# Patient Record
Sex: FEMALE | Race: WHITE | Employment: OTHER | URBAN - METROPOLITAN AREA
[De-identification: names, ages, dates, MRNs, and addresses within clinical notes are randomized per-mention and may not be internally consistent; named-entity substitution may affect disease eponyms.]

---

## 2017-03-21 PROBLEM — E03.9 HYPOTHYROIDISM: Status: ACTIVE | Noted: 2017-03-21

## 2017-03-21 PROBLEM — E55.9 VITAMIN D DEFICIENCY: Status: ACTIVE | Noted: 2017-03-21

## 2017-08-29 ENCOUNTER — CARE COORDINATION (OUTPATIENT)
Dept: CARE COORDINATION | Age: 82
End: 2017-08-29

## 2017-09-19 PROBLEM — N18.30 CKD (CHRONIC KIDNEY DISEASE), STAGE III (HCC): Status: ACTIVE | Noted: 2017-09-19

## 2018-03-20 ENCOUNTER — OFFICE VISIT (OUTPATIENT)
Dept: FAMILY MEDICINE CLINIC | Age: 83
End: 2018-03-20
Payer: MEDICARE

## 2018-03-20 VITALS
SYSTOLIC BLOOD PRESSURE: 113 MMHG | HEIGHT: 67 IN | DIASTOLIC BLOOD PRESSURE: 75 MMHG | RESPIRATION RATE: 20 BRPM | WEIGHT: 150 LBS | BODY MASS INDEX: 23.54 KG/M2 | HEART RATE: 77 BPM | TEMPERATURE: 97.3 F

## 2018-03-20 DIAGNOSIS — N18.30 CKD (CHRONIC KIDNEY DISEASE), STAGE III (HCC): ICD-10-CM

## 2018-03-20 DIAGNOSIS — I48.91 ATRIAL FIBRILLATION WITH RAPID VENTRICULAR RESPONSE (HCC): Primary | ICD-10-CM

## 2018-03-20 DIAGNOSIS — G47.9 SLEEP DISTURBANCE: ICD-10-CM

## 2018-03-20 DIAGNOSIS — R05.9 COUGH: ICD-10-CM

## 2018-03-20 DIAGNOSIS — R49.0 HOARSENESS OF VOICE: ICD-10-CM

## 2018-03-20 LAB
INTERNATIONAL NORMALIZATION RATIO, POC: 2.4
PROTHROMBIN TIME, POC: 29.1

## 2018-03-20 PROCEDURE — G8598 ASA/ANTIPLAT THER USED: HCPCS | Performed by: FAMILY MEDICINE

## 2018-03-20 PROCEDURE — 1036F TOBACCO NON-USER: CPT | Performed by: FAMILY MEDICINE

## 2018-03-20 PROCEDURE — 85610 PROTHROMBIN TIME: CPT | Performed by: FAMILY MEDICINE

## 2018-03-20 PROCEDURE — 1090F PRES/ABSN URINE INCON ASSESS: CPT | Performed by: FAMILY MEDICINE

## 2018-03-20 PROCEDURE — G8427 DOCREV CUR MEDS BY ELIG CLIN: HCPCS | Performed by: FAMILY MEDICINE

## 2018-03-20 PROCEDURE — G8484 FLU IMMUNIZE NO ADMIN: HCPCS | Performed by: FAMILY MEDICINE

## 2018-03-20 PROCEDURE — 1123F ACP DISCUSS/DSCN MKR DOCD: CPT | Performed by: FAMILY MEDICINE

## 2018-03-20 PROCEDURE — 99214 OFFICE O/P EST MOD 30 MIN: CPT | Performed by: FAMILY MEDICINE

## 2018-03-20 PROCEDURE — G8420 CALC BMI NORM PARAMETERS: HCPCS | Performed by: FAMILY MEDICINE

## 2018-03-20 PROCEDURE — 4040F PNEUMOC VAC/ADMIN/RCVD: CPT | Performed by: FAMILY MEDICINE

## 2018-03-20 RX ORDER — OMEPRAZOLE 40 MG/1
40 CAPSULE, DELAYED RELEASE ORAL
Qty: 30 CAPSULE | Refills: 0 | Status: SHIPPED | OUTPATIENT
Start: 2018-03-20 | End: 2018-10-24

## 2018-03-20 NOTE — PROGRESS NOTES
FM Progress Note    Subjective: Travis Gonzalez is a 80y.o. year old female here for HTN and CKD. Health Maintenance Due   Topic Date Due    DTaP/Tdap/Td vaccine (1 - Tdap) 06/15/1947    Shingles Vaccine (1 of 2 - 2 Dose Series) 06/15/1978    Pneumococcal low/med risk (1 of 2 - PCV13) 06/15/1993    Flu vaccine (1) 09/01/2017       Taking metoprolol for HTN. BP is controlled. No CP or SOB. Follows w/ Nephro, needs new appt. Stopped lisinopril in past to see if her voice gets better. It has not. Did not sleep at all the last night. Nothing out of the ordinary yesterday. Watches TV before going to bed but no different than usual. Is currently tired. When pt does fall asleep she gets nocturnal cough. No regurgitation or heartburn. No nasal congestion. Legs do swell, and propping them up on pillows once did not help. Cough for the last month and a half. Walks a lot w/o SOB. For Afib on coumadin and metoprolol and multaq. no palpitations. Past Medical History:   Diagnosis Date    [facility]     Allergic rhinitis     Anticoagulant long-term use     Atrial flutter by electrocardiogram ([facility] [address] 02/10/2014    Admitted [facility] 02/10/14 and cardioverted on 02/13/2014. Resumed on Coumadin.     CAD (coronary artery disease)     CKD (chronic kidney disease), stage III 9/19/2017    HTN (hypertension)     Hyperlipidemia     Hypothyroidism 3/21/2017    MI (myocardial infarction) 6/12    Shingles     Urinary incontinence      Past Surgical History:   Procedure Laterality Date    A-V CARDIAC PACEMAKER INSERTION  11/4/2014    Dr Vinay Gerber DYSRHYTHMIC FOCUS  6/6/12    modified maze procedure with LA excision     CARDIAC CATHETERIZATION  06/04/2012    Dr. Sharmin Liang  10/21/2014    Dr Isabel Kruse:  200joules = SR    COLONOSCOPY      CORONARY ARTERY BYPASS GRAFT  6/6/12    4 vessel / Dr. Radha Gipson  6/5/2012         [address] [facility] omeprazole (PRILOSEC) 40 MG delayed release capsule; Take 1 capsule by mouth daily (with breakfast)    Hoarseness of voice  -     omeprazole (PRILOSEC) 40 MG delayed release capsule; Take 1 capsule by mouth daily (with breakfast)    Sleep disturbance            DDx: Cough due to GERD versus postnasal drip versus less likely CHF. Patient Instructions   Continue the metoprolol and the multaq and the coumadin at the same dose for the Afib. Come back in 1 month for INR check. Continue compression stockings and keep the legs elevated for leg swelling. Make a follow up appointment with your kidney doctor. Make another appointment with your heart doctor. Return in about 1 month (around 4/20/2018) for cough.           Electronically signed by Micheal Nuno MD on 3/20/2018 at 10:37 AM

## 2018-03-20 NOTE — PATIENT INSTRUCTIONS
Continue the metoprolol and the multaq and the coumadin at the same dose for the Afib. Come back in 1 month for INR check. Continue compression stockings and keep the legs elevated for leg swelling. Make a follow up appointment with your kidney doctor. Make another appointment with your heart doctor.

## 2018-03-21 ENCOUNTER — TELEPHONE (OUTPATIENT)
Dept: FAMILY MEDICINE CLINIC | Age: 83
End: 2018-03-21

## 2018-03-21 DIAGNOSIS — I48.91 ATRIAL FIBRILLATION WITH RAPID VENTRICULAR RESPONSE (HCC): ICD-10-CM

## 2018-03-21 RX ORDER — WARFARIN SODIUM 3 MG/1
TABLET ORAL
Qty: 30 TABLET | Refills: 3 | Status: CANCELLED | OUTPATIENT
Start: 2018-03-21

## 2018-03-21 RX ORDER — WARFARIN SODIUM 3 MG/1
TABLET ORAL
Qty: 30 TABLET | Refills: 11 | Status: SHIPPED | OUTPATIENT
Start: 2018-03-21 | End: 2018-04-19 | Stop reason: DRUGHIGH

## 2018-03-22 ENCOUNTER — HOSPITAL ENCOUNTER (OUTPATIENT)
Age: 83
Discharge: HOME | End: 2018-03-22
Payer: MEDICARE

## 2018-03-22 LAB
HAPTOGLOBIN: 141 MG/DL (ref 30–200)
LACTATE DEHYDROGENASE: 235 U/L (ref 135–214)
TOTAL CK: 58 U/L (ref 20–180)

## 2018-03-22 PROCEDURE — 82550 ASSAY OF CK (CPK): CPT

## 2018-03-22 PROCEDURE — 36415 COLL VENOUS BLD VENIPUNCTURE: CPT

## 2018-03-22 PROCEDURE — 83615 LACTATE (LD) (LDH) ENZYME: CPT

## 2018-03-22 PROCEDURE — 83010 ASSAY OF HAPTOGLOBIN QUANT: CPT

## 2018-04-11 ENCOUNTER — OFFICE VISIT (OUTPATIENT)
Dept: FAMILY MEDICINE CLINIC | Age: 83
End: 2018-04-11
Payer: MEDICARE

## 2018-04-11 VITALS
HEART RATE: 75 BPM | RESPIRATION RATE: 16 BRPM | OXYGEN SATURATION: 95 % | WEIGHT: 150 LBS | HEIGHT: 67 IN | SYSTOLIC BLOOD PRESSURE: 117 MMHG | DIASTOLIC BLOOD PRESSURE: 74 MMHG | BODY MASS INDEX: 23.54 KG/M2

## 2018-04-11 DIAGNOSIS — Z91.09 ENVIRONMENTAL ALLERGIES: Primary | ICD-10-CM

## 2018-04-11 DIAGNOSIS — R09.82 POST-NASAL DRIP: ICD-10-CM

## 2018-04-11 PROCEDURE — G8420 CALC BMI NORM PARAMETERS: HCPCS | Performed by: FAMILY MEDICINE

## 2018-04-11 PROCEDURE — 99213 OFFICE O/P EST LOW 20 MIN: CPT | Performed by: FAMILY MEDICINE

## 2018-04-11 PROCEDURE — 1123F ACP DISCUSS/DSCN MKR DOCD: CPT | Performed by: FAMILY MEDICINE

## 2018-04-11 PROCEDURE — 1036F TOBACCO NON-USER: CPT | Performed by: FAMILY MEDICINE

## 2018-04-11 PROCEDURE — G8598 ASA/ANTIPLAT THER USED: HCPCS | Performed by: FAMILY MEDICINE

## 2018-04-11 PROCEDURE — G8427 DOCREV CUR MEDS BY ELIG CLIN: HCPCS | Performed by: FAMILY MEDICINE

## 2018-04-11 PROCEDURE — 4040F PNEUMOC VAC/ADMIN/RCVD: CPT | Performed by: FAMILY MEDICINE

## 2018-04-11 PROCEDURE — 1090F PRES/ABSN URINE INCON ASSESS: CPT | Performed by: FAMILY MEDICINE

## 2018-04-11 RX ORDER — FEXOFENADINE HYDROCHLORIDE 60 MG/1
60 TABLET, FILM COATED ORAL 2 TIMES DAILY
Qty: 7 TABLET | Refills: 0 | Status: SHIPPED | OUTPATIENT
Start: 2018-04-11 | End: 2018-10-24 | Stop reason: ALTCHOICE

## 2018-04-11 RX ORDER — FLUTICASONE PROPIONATE 50 MCG
2 SPRAY, SUSPENSION (ML) NASAL DAILY
Qty: 1 BOTTLE | Refills: 0 | Status: SHIPPED | OUTPATIENT
Start: 2018-04-11 | End: 2018-10-24 | Stop reason: ALTCHOICE

## 2018-04-11 ASSESSMENT — ENCOUNTER SYMPTOMS
NAUSEA: 0
ABDOMINAL PAIN: 0
VOMITING: 0
SINUS PAIN: 0
SHORTNESS OF BREATH: 1
COUGH: 1
SINUS PRESSURE: 0
SORE THROAT: 0
RHINORRHEA: 0

## 2018-04-16 ENCOUNTER — HOSPITAL ENCOUNTER (EMERGENCY)
Age: 83
Discharge: HOME | End: 2018-04-16
Attending: EMERGENCY MEDICINE
Payer: MEDICARE

## 2018-04-16 ENCOUNTER — TELEPHONE (OUTPATIENT)
Dept: FAMILY MEDICINE CLINIC | Age: 83
End: 2018-04-16

## 2018-04-16 VITALS
DIASTOLIC BLOOD PRESSURE: 68 MMHG | SYSTOLIC BLOOD PRESSURE: 122 MMHG | HEIGHT: 67 IN | OXYGEN SATURATION: 94 % | HEART RATE: 78 BPM | TEMPERATURE: 97.9 F | RESPIRATION RATE: 16 BRPM | WEIGHT: 150 LBS | BODY MASS INDEX: 23.54 KG/M2

## 2018-04-16 DIAGNOSIS — R09.82 POSTNASAL DRIP: Primary | ICD-10-CM

## 2018-04-16 PROCEDURE — 99283 EMERGENCY DEPT VISIT LOW MDM: CPT

## 2018-04-18 ENCOUNTER — CARE COORDINATION (OUTPATIENT)
Dept: CARE COORDINATION | Age: 83
End: 2018-04-18

## 2018-04-18 ENCOUNTER — TELEPHONE (OUTPATIENT)
Dept: CARDIOLOGY CLINIC | Age: 83
End: 2018-04-18

## 2018-04-19 ENCOUNTER — ANTI-COAG VISIT (OUTPATIENT)
Dept: FAMILY MEDICINE CLINIC | Age: 83
End: 2018-04-19

## 2018-04-19 ENCOUNTER — OFFICE VISIT (OUTPATIENT)
Dept: FAMILY MEDICINE CLINIC | Age: 83
End: 2018-04-19
Payer: MEDICARE

## 2018-04-19 VITALS
TEMPERATURE: 97.6 F | OXYGEN SATURATION: 96 % | DIASTOLIC BLOOD PRESSURE: 69 MMHG | RESPIRATION RATE: 20 BRPM | HEART RATE: 74 BPM | WEIGHT: 152 LBS | BODY MASS INDEX: 23.86 KG/M2 | HEIGHT: 67 IN | SYSTOLIC BLOOD PRESSURE: 125 MMHG

## 2018-04-19 DIAGNOSIS — M79.89 LEG SWELLING: ICD-10-CM

## 2018-04-19 DIAGNOSIS — I48.91 ATRIAL FIBRILLATION WITH RAPID VENTRICULAR RESPONSE (HCC): ICD-10-CM

## 2018-04-19 DIAGNOSIS — R79.1 SUPRATHERAPEUTIC INR: ICD-10-CM

## 2018-04-19 DIAGNOSIS — R21 RASH: ICD-10-CM

## 2018-04-19 DIAGNOSIS — E03.9 HYPOTHYROIDISM, UNSPECIFIED TYPE: ICD-10-CM

## 2018-04-19 DIAGNOSIS — I48.91 ATRIAL FIBRILLATION WITH RAPID VENTRICULAR RESPONSE (HCC): Primary | ICD-10-CM

## 2018-04-19 DIAGNOSIS — R05.9 COUGH: ICD-10-CM

## 2018-04-19 LAB
INTERNATIONAL NORMALIZATION RATIO, POC: 3.5
PROTHROMBIN TIME, POC: 42.5

## 2018-04-19 PROCEDURE — G8427 DOCREV CUR MEDS BY ELIG CLIN: HCPCS | Performed by: FAMILY MEDICINE

## 2018-04-19 PROCEDURE — 4040F PNEUMOC VAC/ADMIN/RCVD: CPT | Performed by: FAMILY MEDICINE

## 2018-04-19 PROCEDURE — 85610 PROTHROMBIN TIME: CPT | Performed by: FAMILY MEDICINE

## 2018-04-19 PROCEDURE — G8420 CALC BMI NORM PARAMETERS: HCPCS | Performed by: FAMILY MEDICINE

## 2018-04-19 PROCEDURE — 1090F PRES/ABSN URINE INCON ASSESS: CPT | Performed by: FAMILY MEDICINE

## 2018-04-19 PROCEDURE — 1123F ACP DISCUSS/DSCN MKR DOCD: CPT | Performed by: FAMILY MEDICINE

## 2018-04-19 PROCEDURE — G8598 ASA/ANTIPLAT THER USED: HCPCS | Performed by: FAMILY MEDICINE

## 2018-04-19 PROCEDURE — 99214 OFFICE O/P EST MOD 30 MIN: CPT | Performed by: FAMILY MEDICINE

## 2018-04-19 PROCEDURE — 1036F TOBACCO NON-USER: CPT | Performed by: FAMILY MEDICINE

## 2018-04-19 RX ORDER — WARFARIN SODIUM 3 MG/1
TABLET ORAL
Qty: 30 TABLET | Refills: 11 | Status: SHIPPED | OUTPATIENT
Start: 2018-04-19 | End: 2018-10-24 | Stop reason: DRUGHIGH

## 2018-04-27 ENCOUNTER — TELEPHONE (OUTPATIENT)
Dept: FAMILY MEDICINE CLINIC | Age: 83
End: 2018-04-27

## 2018-04-27 ENCOUNTER — NURSE ONLY (OUTPATIENT)
Dept: FAMILY MEDICINE CLINIC | Age: 83
End: 2018-04-27
Payer: MEDICARE

## 2018-04-27 ENCOUNTER — ANTI-COAG VISIT (OUTPATIENT)
Dept: FAMILY MEDICINE CLINIC | Age: 83
End: 2018-04-27

## 2018-04-27 DIAGNOSIS — I48.91 ATRIAL FIBRILLATION WITH RAPID VENTRICULAR RESPONSE (HCC): ICD-10-CM

## 2018-04-27 DIAGNOSIS — I48.91 ATRIAL FIBRILLATION WITH RAPID VENTRICULAR RESPONSE (HCC): Primary | ICD-10-CM

## 2018-04-27 LAB
INTERNATIONAL NORMALIZATION RATIO, POC: 2.7
PROTHROMBIN TIME, POC: 32.7

## 2018-04-27 PROCEDURE — 85610 PROTHROMBIN TIME: CPT | Performed by: FAMILY MEDICINE

## 2018-04-27 RX ORDER — BENZONATATE 100 MG/1
100 CAPSULE ORAL 3 TIMES DAILY PRN
Qty: 21 CAPSULE | Refills: 0 | Status: SHIPPED | OUTPATIENT
Start: 2018-04-27 | End: 2018-05-04

## 2018-04-27 RX ORDER — CEFDINIR 300 MG/1
300 CAPSULE ORAL 2 TIMES DAILY
Qty: 20 CAPSULE | Refills: 0 | Status: SHIPPED | OUTPATIENT
Start: 2018-04-27 | End: 2018-05-07

## 2018-05-11 ENCOUNTER — NURSE ONLY (OUTPATIENT)
Dept: FAMILY MEDICINE CLINIC | Age: 83
End: 2018-05-11
Payer: MEDICARE

## 2018-05-11 ENCOUNTER — ANTI-COAG VISIT (OUTPATIENT)
Dept: FAMILY MEDICINE CLINIC | Age: 83
End: 2018-05-11

## 2018-05-11 DIAGNOSIS — I48.91 ATRIAL FIBRILLATION WITH RAPID VENTRICULAR RESPONSE (HCC): Primary | ICD-10-CM

## 2018-05-11 DIAGNOSIS — I48.91 ATRIAL FIBRILLATION WITH RAPID VENTRICULAR RESPONSE (HCC): ICD-10-CM

## 2018-05-11 LAB
INTERNATIONAL NORMALIZATION RATIO, POC: 2.4
PROTHROMBIN TIME, POC: 28.4

## 2018-05-11 PROCEDURE — 85610 PROTHROMBIN TIME: CPT | Performed by: FAMILY MEDICINE

## 2018-05-11 PROCEDURE — 93793 ANTICOAG MGMT PT WARFARIN: CPT | Performed by: FAMILY MEDICINE

## 2018-05-23 ENCOUNTER — TELEPHONE (OUTPATIENT)
Dept: CARDIOLOGY CLINIC | Age: 83
End: 2018-05-23

## 2018-05-23 ENCOUNTER — NURSE ONLY (OUTPATIENT)
Dept: CARDIOLOGY CLINIC | Age: 83
End: 2018-05-23
Payer: MEDICARE

## 2018-05-23 ENCOUNTER — OFFICE VISIT (OUTPATIENT)
Dept: CARDIOLOGY CLINIC | Age: 83
End: 2018-05-23
Payer: MEDICARE

## 2018-05-23 VITALS
RESPIRATION RATE: 18 BRPM | SYSTOLIC BLOOD PRESSURE: 120 MMHG | BODY MASS INDEX: 23.79 KG/M2 | WEIGHT: 151.6 LBS | DIASTOLIC BLOOD PRESSURE: 64 MMHG | HEART RATE: 95 BPM | HEIGHT: 67 IN

## 2018-05-23 DIAGNOSIS — I48.0 PAROXYSMAL ATRIAL FIBRILLATION (HCC): ICD-10-CM

## 2018-05-23 DIAGNOSIS — R60.0 EDEMA OF BOTH FEET: ICD-10-CM

## 2018-05-23 DIAGNOSIS — Z95.0 PACEMAKER: ICD-10-CM

## 2018-05-23 DIAGNOSIS — R06.02 SOBOE (SHORTNESS OF BREATH ON EXERTION): ICD-10-CM

## 2018-05-23 DIAGNOSIS — I48.91 ATRIAL FIBRILLATION WITH RAPID VENTRICULAR RESPONSE (HCC): ICD-10-CM

## 2018-05-23 DIAGNOSIS — I25.10 CORONARY ARTERY DISEASE INVOLVING NATIVE CORONARY ARTERY OF NATIVE HEART WITHOUT ANGINA PECTORIS: Primary | ICD-10-CM

## 2018-05-23 DIAGNOSIS — I10 ESSENTIAL HYPERTENSION: ICD-10-CM

## 2018-05-23 DIAGNOSIS — Z95.0 PRESENCE OF PERMANENT CARDIAC PACEMAKER: Primary | ICD-10-CM

## 2018-05-23 DIAGNOSIS — N18.30 CKD (CHRONIC KIDNEY DISEASE), STAGE III (HCC): ICD-10-CM

## 2018-05-23 DIAGNOSIS — I38 VHD (VALVULAR HEART DISEASE): ICD-10-CM

## 2018-05-23 DIAGNOSIS — Z95.1 HX OF CABG: ICD-10-CM

## 2018-05-23 PROCEDURE — 1036F TOBACCO NON-USER: CPT | Performed by: INTERNAL MEDICINE

## 2018-05-23 PROCEDURE — 1090F PRES/ABSN URINE INCON ASSESS: CPT | Performed by: INTERNAL MEDICINE

## 2018-05-23 PROCEDURE — 1123F ACP DISCUSS/DSCN MKR DOCD: CPT | Performed by: INTERNAL MEDICINE

## 2018-05-23 PROCEDURE — 93280 PM DEVICE PROGR EVAL DUAL: CPT | Performed by: INTERNAL MEDICINE

## 2018-05-23 PROCEDURE — 4040F PNEUMOC VAC/ADMIN/RCVD: CPT | Performed by: INTERNAL MEDICINE

## 2018-05-23 PROCEDURE — 99214 OFFICE O/P EST MOD 30 MIN: CPT | Performed by: INTERNAL MEDICINE

## 2018-05-23 PROCEDURE — G8598 ASA/ANTIPLAT THER USED: HCPCS | Performed by: INTERNAL MEDICINE

## 2018-05-23 PROCEDURE — G8427 DOCREV CUR MEDS BY ELIG CLIN: HCPCS | Performed by: INTERNAL MEDICINE

## 2018-05-23 PROCEDURE — G8420 CALC BMI NORM PARAMETERS: HCPCS | Performed by: INTERNAL MEDICINE

## 2018-05-23 RX ORDER — CALCITRIOL 0.25 UG/1
0.25 CAPSULE, LIQUID FILLED ORAL
Refills: 0 | COMMUNITY
Start: 2018-04-25 | End: 2019-04-27

## 2018-06-11 LAB
LEFT VENTRICULAR EJECTION FRACTION HIGH VALUE: 30 %
LEFT VENTRICULAR EJECTION FRACTION MODE: NORMAL
LV EF: 25 %

## 2018-06-15 ENCOUNTER — TELEPHONE (OUTPATIENT)
Dept: CARDIOLOGY CLINIC | Age: 83
End: 2018-06-15

## 2018-06-27 ENCOUNTER — OFFICE VISIT (OUTPATIENT)
Dept: CARDIOLOGY CLINIC | Age: 83
End: 2018-06-27
Payer: MEDICARE

## 2018-06-27 ENCOUNTER — ANTI-COAG VISIT (OUTPATIENT)
Dept: FAMILY MEDICINE CLINIC | Age: 83
End: 2018-06-27

## 2018-06-27 VITALS
HEIGHT: 67 IN | WEIGHT: 132.4 LBS | SYSTOLIC BLOOD PRESSURE: 112 MMHG | BODY MASS INDEX: 20.78 KG/M2 | RESPIRATION RATE: 16 BRPM | HEART RATE: 73 BPM | DIASTOLIC BLOOD PRESSURE: 60 MMHG

## 2018-06-27 DIAGNOSIS — I25.10 CORONARY ARTERY DISEASE INVOLVING NATIVE CORONARY ARTERY OF NATIVE HEART WITHOUT ANGINA PECTORIS: ICD-10-CM

## 2018-06-27 DIAGNOSIS — Z95.1 HX OF CABG: ICD-10-CM

## 2018-06-27 DIAGNOSIS — Z95.0 STATUS POST PLACEMENT OF CARDIAC PACEMAKER: ICD-10-CM

## 2018-06-27 DIAGNOSIS — I10 ESSENTIAL HYPERTENSION: ICD-10-CM

## 2018-06-27 DIAGNOSIS — I34.0 MODERATE TO SEVERE MITRAL REGURGITATION: ICD-10-CM

## 2018-06-27 DIAGNOSIS — I48.91 ATRIAL FIBRILLATION WITH RAPID VENTRICULAR RESPONSE (HCC): Primary | ICD-10-CM

## 2018-06-27 DIAGNOSIS — I07.1 SEVERE TRICUSPID VALVE REGURGITATION: ICD-10-CM

## 2018-06-27 DIAGNOSIS — I38 VHD (VALVULAR HEART DISEASE): ICD-10-CM

## 2018-06-27 DIAGNOSIS — I27.20 PULMONARY HYPERTENSION, MODERATE TO SEVERE (HCC): ICD-10-CM

## 2018-06-27 DIAGNOSIS — I25.5 ISCHEMIC CARDIOMYOPATHY: ICD-10-CM

## 2018-06-27 PROCEDURE — G8420 CALC BMI NORM PARAMETERS: HCPCS | Performed by: INTERNAL MEDICINE

## 2018-06-27 PROCEDURE — 93000 ELECTROCARDIOGRAM COMPLETE: CPT | Performed by: INTERNAL MEDICINE

## 2018-06-27 PROCEDURE — 4040F PNEUMOC VAC/ADMIN/RCVD: CPT | Performed by: INTERNAL MEDICINE

## 2018-06-27 PROCEDURE — G8427 DOCREV CUR MEDS BY ELIG CLIN: HCPCS | Performed by: INTERNAL MEDICINE

## 2018-06-27 PROCEDURE — 1090F PRES/ABSN URINE INCON ASSESS: CPT | Performed by: INTERNAL MEDICINE

## 2018-06-27 PROCEDURE — 1123F ACP DISCUSS/DSCN MKR DOCD: CPT | Performed by: INTERNAL MEDICINE

## 2018-06-27 PROCEDURE — 1036F TOBACCO NON-USER: CPT | Performed by: INTERNAL MEDICINE

## 2018-06-27 PROCEDURE — 99214 OFFICE O/P EST MOD 30 MIN: CPT | Performed by: INTERNAL MEDICINE

## 2018-06-27 PROCEDURE — G8598 ASA/ANTIPLAT THER USED: HCPCS | Performed by: INTERNAL MEDICINE

## 2018-06-27 PROCEDURE — 1101F PT FALLS ASSESS-DOCD LE1/YR: CPT | Performed by: INTERNAL MEDICINE

## 2018-06-27 RX ORDER — FUROSEMIDE 20 MG/1
10 TABLET ORAL DAILY
Refills: 0 | COMMUNITY
Start: 2018-05-26

## 2018-06-27 NOTE — PROGRESS NOTES
BB, Diuretics; No ACE/ARB/Entrsto due to low BP due CKD  -     EKG 12 Lead    VHD (valvular heart disease): Stable    Pulmonary hypertension, moderate to severe    Severe tricuspid valve regurgitation    Coronary artery disease involving native coronary artery of native heart without angina pectoris; Stable     Essential hypertension: Stable    Status post placement of cardiac pacemaker-11/2014-Normal Fn     Preventive Cardiology: Low cholesterol diet, regular exercise as tolerate, and gradual weight loss discussed. Monitor BP and heart rates. No family with her; Echo findings discussed   All questions answered about cardiac diagnoses and cardiac medications. Continue current medications. Compliance with medications and f/u with all physicians discussed. Risk factor modification based on risk profile discussed. Call if any exertional chest pain, short of breath, dizzy or palpitations   Follow up in 4 months or earlier if needed.          [facility]  [address], [place], [address] [facility]  [phone]

## 2018-08-30 ENCOUNTER — HOSPITAL ENCOUNTER (OUTPATIENT)
Age: 83
Discharge: HOME | End: 2018-08-30
Payer: MEDICARE

## 2018-08-30 LAB
ALBUMIN SERPL-MCNC: 4.3 G/DL (ref 3.5–5.2)
ANION GAP SERPL CALCULATED.3IONS-SCNC: 10 MMOL/L (ref 7–16)
BACTERIA: ABNORMAL /HPF
BILIRUBIN URINE: NEGATIVE
BLOOD, URINE: ABNORMAL
BUN BLDV-MCNC: 22 MG/DL (ref 8–23)
CALCIUM SERPL-MCNC: 11.4 MG/DL (ref 8.6–10.2)
CHLORIDE BLD-SCNC: 106 MMOL/L (ref 98–107)
CHOLESTEROL, TOTAL: 158 MG/DL (ref 0–199)
CLARITY: CLEAR
CO2: 27 MMOL/L (ref 22–29)
COLOR: YELLOW
CREAT SERPL-MCNC: 1.2 MG/DL (ref 0.5–1)
CREATININE URINE: 153 MG/DL (ref 29–226)
GFR AFRICAN AMERICAN: 51
GFR NON-AFRICAN AMERICAN: 42 ML/MIN/1.73
GLUCOSE BLD-MCNC: 100 MG/DL (ref 74–109)
GLUCOSE URINE: NEGATIVE MG/DL
HBA1C MFR BLD: 5.6 % (ref 4–5.6)
HCT VFR BLD CALC: 44.5 % (ref 34–48)
HDLC SERPL-MCNC: 63 MG/DL
HEMOGLOBIN: 14.3 G/DL (ref 11.5–15.5)
IRON SATURATION: 31 % (ref 15–50)
IRON: 85 MCG/DL (ref 37–145)
KETONES, URINE: NEGATIVE MG/DL
LDL CHOLESTEROL CALCULATED: 75 MG/DL (ref 0–99)
LEUKOCYTE ESTERASE, URINE: NEGATIVE
MCH RBC QN AUTO: 31.1 PG (ref 26–35)
MCHC RBC AUTO-ENTMCNC: 32.1 % (ref 32–34.5)
MCV RBC AUTO: 96.7 FL (ref 80–99.9)
MICROALBUMIN UR-MCNC: 40.2 MG/L
MICROALBUMIN/CREAT UR-RTO: 26.3 (ref 0–30)
NITRITE, URINE: NEGATIVE
PARATHYROID HORMONE INTACT: 171 PG/ML (ref 15–65)
PDW BLD-RTO: 14.6 FL (ref 11.5–15)
PH UA: 5.5 (ref 5–9)
PHOSPHORUS: 2.9 MG/DL (ref 2.5–4.5)
PLATELET # BLD: 168 E9/L (ref 130–450)
PMV BLD AUTO: 9.6 FL (ref 7–12)
POTASSIUM SERPL-SCNC: 4.5 MMOL/L (ref 3.5–5)
PROTEIN UA: NEGATIVE MG/DL
RBC # BLD: 4.6 E12/L (ref 3.5–5.5)
RBC UA: ABNORMAL /HPF (ref 0–2)
SODIUM BLD-SCNC: 143 MMOL/L (ref 132–146)
SPECIFIC GRAVITY UA: 1.02 (ref 1–1.03)
TOTAL IRON BINDING CAPACITY: 275 MCG/DL (ref 250–450)
TRIGL SERPL-MCNC: 100 MG/DL (ref 0–149)
UROBILINOGEN, URINE: 0.2 E.U./DL
VITAMIN D 25-HYDROXY: 28 NG/ML (ref 30–100)
VLDLC SERPL CALC-MCNC: 20 MG/DL
WBC # BLD: 6.7 E9/L (ref 4.5–11.5)
WBC UA: ABNORMAL /HPF (ref 0–5)

## 2018-08-30 PROCEDURE — 83550 IRON BINDING TEST: CPT

## 2018-08-30 PROCEDURE — 85027 COMPLETE CBC AUTOMATED: CPT

## 2018-08-30 PROCEDURE — 80048 BASIC METABOLIC PNL TOTAL CA: CPT

## 2018-08-30 PROCEDURE — 81001 URINALYSIS AUTO W/SCOPE: CPT

## 2018-08-30 PROCEDURE — 83540 ASSAY OF IRON: CPT

## 2018-08-30 PROCEDURE — 83036 HEMOGLOBIN GLYCOSYLATED A1C: CPT

## 2018-08-30 PROCEDURE — 84100 ASSAY OF PHOSPHORUS: CPT

## 2018-08-30 PROCEDURE — 36415 COLL VENOUS BLD VENIPUNCTURE: CPT

## 2018-08-30 PROCEDURE — 83970 ASSAY OF PARATHORMONE: CPT

## 2018-08-30 PROCEDURE — 82306 VITAMIN D 25 HYDROXY: CPT

## 2018-08-30 PROCEDURE — 82044 UR ALBUMIN SEMIQUANTITATIVE: CPT

## 2018-08-30 PROCEDURE — 82040 ASSAY OF SERUM ALBUMIN: CPT

## 2018-08-30 PROCEDURE — 82570 ASSAY OF URINE CREATININE: CPT

## 2018-08-30 PROCEDURE — 80061 LIPID PANEL: CPT

## 2018-09-18 ENCOUNTER — HOSPITAL ENCOUNTER (OUTPATIENT)
Age: 83
Discharge: HOME | End: 2018-09-18
Payer: MEDICARE

## 2018-09-18 LAB — CALCIUM IONIZED: 1.48 MMOL/L (ref 1.15–1.33)

## 2018-09-18 PROCEDURE — 82330 ASSAY OF CALCIUM: CPT

## 2018-09-18 PROCEDURE — 36415 COLL VENOUS BLD VENIPUNCTURE: CPT

## 2018-10-16 ENCOUNTER — HOSPITAL ENCOUNTER (OUTPATIENT)
Age: 83
Discharge: HOME | End: 2018-10-16
Payer: MEDICARE

## 2018-10-16 LAB — CALCIUM IONIZED: 1.45 MMOL/L (ref 1.15–1.33)

## 2018-10-16 PROCEDURE — 82330 ASSAY OF CALCIUM: CPT

## 2018-10-16 PROCEDURE — 36415 COLL VENOUS BLD VENIPUNCTURE: CPT

## 2018-10-24 ENCOUNTER — OFFICE VISIT (OUTPATIENT)
Dept: CARDIOLOGY CLINIC | Age: 83
End: 2018-10-24
Payer: MEDICARE

## 2018-10-24 ENCOUNTER — NURSE ONLY (OUTPATIENT)
Dept: CARDIOLOGY CLINIC | Age: 83
End: 2018-10-24
Payer: MEDICARE

## 2018-10-24 VITALS
DIASTOLIC BLOOD PRESSURE: 56 MMHG | OXYGEN SATURATION: 93 % | HEART RATE: 84 BPM | BODY MASS INDEX: 20.72 KG/M2 | WEIGHT: 132 LBS | RESPIRATION RATE: 16 BRPM | HEIGHT: 67 IN | SYSTOLIC BLOOD PRESSURE: 132 MMHG

## 2018-10-24 DIAGNOSIS — I38 VHD (VALVULAR HEART DISEASE): ICD-10-CM

## 2018-10-24 DIAGNOSIS — I48.21 PERMANENT ATRIAL FIBRILLATION (HCC): ICD-10-CM

## 2018-10-24 DIAGNOSIS — I34.0 NON-RHEUMATIC MITRAL REGURGITATION: ICD-10-CM

## 2018-10-24 DIAGNOSIS — I25.5 ISCHEMIC CARDIOMYOPATHY: ICD-10-CM

## 2018-10-24 DIAGNOSIS — I48.0 PAF (PAROXYSMAL ATRIAL FIBRILLATION) (HCC): ICD-10-CM

## 2018-10-24 DIAGNOSIS — Z95.1 STATUS POST CORONARY ARTERY BYPASS GRAFT: ICD-10-CM

## 2018-10-24 DIAGNOSIS — Z95.0 PRESENCE OF PERMANENT CARDIAC PACEMAKER: Primary | ICD-10-CM

## 2018-10-24 DIAGNOSIS — I27.20 PULMONARY HTN (HCC): ICD-10-CM

## 2018-10-24 DIAGNOSIS — I25.10 CORONARY ARTERY DISEASE INVOLVING NATIVE CORONARY ARTERY OF NATIVE HEART WITHOUT ANGINA PECTORIS: Primary | ICD-10-CM

## 2018-10-24 PROCEDURE — 4040F PNEUMOC VAC/ADMIN/RCVD: CPT | Performed by: INTERNAL MEDICINE

## 2018-10-24 PROCEDURE — 1090F PRES/ABSN URINE INCON ASSESS: CPT | Performed by: INTERNAL MEDICINE

## 2018-10-24 PROCEDURE — G8420 CALC BMI NORM PARAMETERS: HCPCS | Performed by: INTERNAL MEDICINE

## 2018-10-24 PROCEDURE — G8484 FLU IMMUNIZE NO ADMIN: HCPCS | Performed by: INTERNAL MEDICINE

## 2018-10-24 PROCEDURE — G8598 ASA/ANTIPLAT THER USED: HCPCS | Performed by: INTERNAL MEDICINE

## 2018-10-24 PROCEDURE — G8427 DOCREV CUR MEDS BY ELIG CLIN: HCPCS | Performed by: INTERNAL MEDICINE

## 2018-10-24 PROCEDURE — 1036F TOBACCO NON-USER: CPT | Performed by: INTERNAL MEDICINE

## 2018-10-24 PROCEDURE — 93280 PM DEVICE PROGR EVAL DUAL: CPT | Performed by: INTERNAL MEDICINE

## 2018-10-24 PROCEDURE — 1123F ACP DISCUSS/DSCN MKR DOCD: CPT | Performed by: INTERNAL MEDICINE

## 2018-10-24 PROCEDURE — 1101F PT FALLS ASSESS-DOCD LE1/YR: CPT | Performed by: INTERNAL MEDICINE

## 2018-10-24 PROCEDURE — 99214 OFFICE O/P EST MOD 30 MIN: CPT | Performed by: INTERNAL MEDICINE

## 2018-10-24 RX ORDER — WARFARIN SODIUM 4 MG/1
4 TABLET ORAL DAILY
COMMUNITY

## 2019-04-11 ENCOUNTER — HOSPITAL ENCOUNTER (OUTPATIENT)
Age: 84
Discharge: HOME | End: 2019-04-11
Payer: MEDICARE

## 2019-04-11 LAB
PARATHYROID HORMONE INTACT: 180 PG/ML (ref 15–65)
VITAMIN D 25-HYDROXY: 43 NG/ML (ref 30–100)

## 2019-04-11 PROCEDURE — 36415 COLL VENOUS BLD VENIPUNCTURE: CPT

## 2019-04-11 PROCEDURE — 83970 ASSAY OF PARATHORMONE: CPT

## 2019-04-11 PROCEDURE — 82306 VITAMIN D 25 HYDROXY: CPT

## 2019-04-27 ENCOUNTER — HOSPITAL ENCOUNTER (EMERGENCY)
Age: 84
Discharge: HOME | End: 2019-04-27
Attending: FAMILY MEDICINE
Payer: MEDICARE

## 2019-04-27 VITALS
OXYGEN SATURATION: 99 % | RESPIRATION RATE: 16 BRPM | SYSTOLIC BLOOD PRESSURE: 118 MMHG | DIASTOLIC BLOOD PRESSURE: 78 MMHG | BODY MASS INDEX: 20.25 KG/M2 | TEMPERATURE: 98.7 F | WEIGHT: 126 LBS | HEART RATE: 90 BPM | HEIGHT: 66 IN

## 2019-04-27 DIAGNOSIS — R31.9 URINARY TRACT INFECTION WITH HEMATURIA, SITE UNSPECIFIED: Primary | ICD-10-CM

## 2019-04-27 DIAGNOSIS — N39.0 URINARY TRACT INFECTION WITH HEMATURIA, SITE UNSPECIFIED: Primary | ICD-10-CM

## 2019-04-27 DIAGNOSIS — Z79.01 CURRENT USE OF LONG TERM ANTICOAGULATION: ICD-10-CM

## 2019-04-27 LAB
ALBUMIN SERPL-MCNC: 4.1 G/DL (ref 3.5–5.2)
ALP BLD-CCNC: 61 U/L (ref 35–104)
ALT SERPL-CCNC: 6 U/L (ref 0–32)
ANION GAP SERPL CALCULATED.3IONS-SCNC: 12 MMOL/L (ref 7–16)
AST SERPL-CCNC: 34 U/L (ref 0–31)
BACTERIA: ABNORMAL /HPF
BASOPHILS ABSOLUTE: 0.02 E9/L (ref 0–0.2)
BASOPHILS RELATIVE PERCENT: 0.4 % (ref 0–2)
BILIRUB SERPL-MCNC: 0.9 MG/DL (ref 0–1.2)
BILIRUBIN URINE: NEGATIVE
BLOOD, URINE: ABNORMAL
BUN BLDV-MCNC: 19 MG/DL (ref 8–23)
CALCIUM SERPL-MCNC: 10.3 MG/DL (ref 8.6–10.2)
CHLORIDE BLD-SCNC: 104 MMOL/L (ref 98–107)
CLARITY: ABNORMAL
CO2: 25 MMOL/L (ref 22–29)
COLOR: ABNORMAL
CREAT SERPL-MCNC: 1 MG/DL (ref 0.5–1)
EOSINOPHILS ABSOLUTE: 0.06 E9/L (ref 0.05–0.5)
EOSINOPHILS RELATIVE PERCENT: 1.2 % (ref 0–6)
EPITHELIAL CELLS, UA: ABNORMAL /HPF
GFR AFRICAN AMERICAN: >60
GFR NON-AFRICAN AMERICAN: 52 ML/MIN/1.73
GLUCOSE BLD-MCNC: 153 MG/DL (ref 74–99)
GLUCOSE URINE: NEGATIVE MG/DL
HCT VFR BLD CALC: 46.4 % (ref 34–48)
HEMOGLOBIN: 14.8 G/DL (ref 11.5–15.5)
IMMATURE GRANULOCYTES #: 0.02 E9/L
IMMATURE GRANULOCYTES %: 0.4 % (ref 0–5)
INR BLD: 2.3
KETONES, URINE: NEGATIVE MG/DL
LACTIC ACID: 1.9 MMOL/L (ref 0.5–2.2)
LEUKOCYTE ESTERASE, URINE: NEGATIVE
LYMPHOCYTES ABSOLUTE: 1.38 E9/L (ref 1.5–4)
LYMPHOCYTES RELATIVE PERCENT: 26.7 % (ref 20–42)
MCH RBC QN AUTO: 31.4 PG (ref 26–35)
MCHC RBC AUTO-ENTMCNC: 31.9 % (ref 32–34.5)
MCV RBC AUTO: 98.5 FL (ref 80–99.9)
MONOCYTES ABSOLUTE: 0.34 E9/L (ref 0.1–0.95)
MONOCYTES RELATIVE PERCENT: 6.6 % (ref 2–12)
NEUTROPHILS ABSOLUTE: 3.35 E9/L (ref 1.8–7.3)
NEUTROPHILS RELATIVE PERCENT: 64.7 % (ref 43–80)
NITRITE, URINE: NEGATIVE
PDW BLD-RTO: 13.8 FL (ref 11.5–15)
PH UA: 5 (ref 5–9)
PLATELET # BLD: 164 E9/L (ref 130–450)
PMV BLD AUTO: 9.8 FL (ref 7–12)
POTASSIUM SERPL-SCNC: 4.6 MMOL/L (ref 3.5–5)
PROTEIN UA: 100 MG/DL
PROTHROMBIN TIME: 25.9 SEC (ref 9.3–12.4)
RBC # BLD: 4.71 E12/L (ref 3.5–5.5)
RBC UA: ABNORMAL /HPF (ref 0–2)
SODIUM BLD-SCNC: 141 MMOL/L (ref 132–146)
SPECIFIC GRAVITY UA: >=1.03 (ref 1–1.03)
TOTAL PROTEIN: 7.3 G/DL (ref 6.4–8.3)
UROBILINOGEN, URINE: 1 E.U./DL
WBC # BLD: 5.2 E9/L (ref 4.5–11.5)
WBC UA: >20 /HPF (ref 0–5)

## 2019-04-27 PROCEDURE — 2580000003 HC RX 258: Performed by: FAMILY MEDICINE

## 2019-04-27 PROCEDURE — 6360000002 HC RX W HCPCS: Performed by: FAMILY MEDICINE

## 2019-04-27 PROCEDURE — 36415 COLL VENOUS BLD VENIPUNCTURE: CPT

## 2019-04-27 PROCEDURE — 6370000000 HC RX 637 (ALT 250 FOR IP): Performed by: FAMILY MEDICINE

## 2019-04-27 PROCEDURE — 87077 CULTURE AEROBIC IDENTIFY: CPT

## 2019-04-27 PROCEDURE — 87186 SC STD MICRODIL/AGAR DIL: CPT

## 2019-04-27 PROCEDURE — 96365 THER/PROPH/DIAG IV INF INIT: CPT

## 2019-04-27 PROCEDURE — 85610 PROTHROMBIN TIME: CPT

## 2019-04-27 PROCEDURE — 83605 ASSAY OF LACTIC ACID: CPT

## 2019-04-27 PROCEDURE — 81001 URINALYSIS AUTO W/SCOPE: CPT

## 2019-04-27 PROCEDURE — 87088 URINE BACTERIA CULTURE: CPT

## 2019-04-27 PROCEDURE — 80053 COMPREHEN METABOLIC PANEL: CPT

## 2019-04-27 PROCEDURE — 85025 COMPLETE CBC W/AUTO DIFF WBC: CPT

## 2019-04-27 PROCEDURE — 99283 EMERGENCY DEPT VISIT LOW MDM: CPT

## 2019-04-27 RX ORDER — ACETAMINOPHEN 500 MG
1000 TABLET ORAL ONCE
Status: COMPLETED | OUTPATIENT
Start: 2019-04-27 | End: 2019-04-27

## 2019-04-27 RX ORDER — CEFAZOLIN SODIUM 1 G/50ML
1 SOLUTION INTRAVENOUS ONCE
Status: COMPLETED | OUTPATIENT
Start: 2019-04-27 | End: 2019-04-27

## 2019-04-27 RX ORDER — 0.9 % SODIUM CHLORIDE 0.9 %
1000 INTRAVENOUS SOLUTION INTRAVENOUS ONCE
Status: COMPLETED | OUTPATIENT
Start: 2019-04-27 | End: 2019-04-27

## 2019-04-27 RX ORDER — CEPHALEXIN 500 MG/1
500 CAPSULE ORAL 3 TIMES DAILY
Qty: 21 CAPSULE | Refills: 0 | Status: ON HOLD | OUTPATIENT
Start: 2019-04-27 | End: 2019-05-05 | Stop reason: HOSPADM

## 2019-04-27 RX ADMIN — SODIUM CHLORIDE 1000 ML: 9 INJECTION, SOLUTION INTRAVENOUS at 10:05

## 2019-04-27 RX ADMIN — CEFAZOLIN SODIUM 1 G: 1 SOLUTION INTRAVENOUS at 10:47

## 2019-04-27 RX ADMIN — ACETAMINOPHEN 1000 MG: 500 TABLET ORAL at 11:28

## 2019-04-27 ASSESSMENT — PAIN SCALES - GENERAL: PAINLEVEL_OUTOF10: 5

## 2019-04-27 NOTE — ED PROVIDER NOTES
Department of Emergency Medicine   ED  Provider Note  Admit Date/RoomTime: 4/27/2019  9:30 AM  ED Room: 01/01  Chief Complaint:   Vaginal Bleeding (since this morning) and Dizziness (since yesterday)    History of Present Illness   Source of history provided by:  patient and caregiver / friend. History/Exam Limitations: none. Vi Guallpa is a 80 y.o. old female who has a past medical history of:   Past Medical History:   Diagnosis Date    Afib ([facility] [address]     Allergic rhinitis     Anticoagulant long-term use     Atrial flutter by electrocardiogram ([facility] [address] 02/10/2014    Admitted [facility] 02/10/14 and cardioverted on 02/13/2014. Resumed on Coumadin.  CAD (coronary artery disease)     CKD (chronic kidney disease), stage III ([facility] 9/19/2017    HTN (hypertension)     Hyperlipidemia     Hypothyroidism 3/21/2017    MI (myocardial infarction) ([facility] [address] 6/12    Shingles     Urinary incontinence     presents to the emergency department by private vehicle and ambulatory, for blood in urine, urinary frequency, which occured 1 day(s) prior to arrival.  Since onset the symptoms have been persistent and moderate in severity. Symptoms are associated with dizziness when getting up. She has a history of no complicating symptoms. Patient is on Warfarin. Denies any headache any blurred vision any weakness numbness tingling any difficulty walking no syncope or near-syncope no head trauma no chest or back pain does have some bladder pressure. Blood is mainly noted with urination prior to urination there is no bleeding. ROS   Pertinent positives and negatives are stated within HPI, all other systems reviewed and are negative.     Past Surgical History:   Procedure Laterality Date    A-V CARDIAC PACEMAKER INSERTION  11/4/2014    Dr Isidro Edgar  6/6/12    modified maze procedure with LA excision     CARDIAC CATHETERIZATION  06/04/2012    Dr. Karely Salinas  10/21/2014    Dr Chandra Sanchez: with Microscopic   Result Value Ref Range    Color, UA RED (A) Straw/Yellow    Clarity, UA TURBID (A) Clear    Glucose, Ur Negative Negative mg/dL    Bilirubin Urine Negative Negative    Ketones, Urine Negative Negative mg/dL    Specific Gravity, UA >=1.030 1.005 - 1.030    Blood, Urine LARGE (A) Negative    pH, UA 5.0 5.0 - 9.0    Protein,  (A) Negative mg/dL    Urobilinogen, Urine 1.0 <2.0 E.U./dL    Nitrite, Urine Negative Negative    Leukocyte Esterase, Urine Negative Negative    WBC, UA >20 0 - 5 /HPF    RBC, UA PACKED 0 - 2 /HPF    Epi Cells FEW /HPF    Bacteria, UA MODERATE (A) /HPF   CBC Auto Differential   Result Value Ref Range    WBC 5.2 4.5 - 11.5 E9/L    RBC 4.71 3.50 - 5.50 E12/L    Hemoglobin 14.8 11.5 - 15.5 g/dL    Hematocrit 46.4 34.0 - 48.0 %    MCV 98.5 80.0 - 99.9 fL    MCH 31.4 26.0 - 35.0 pg    MCHC 31.9 (L) 32.0 - 34.5 %    RDW 13.8 11.5 - 15.0 fL    Platelets 886 882 - 775 E9/L    MPV 9.8 7.0 - 12.0 fL    Neutrophils % 64.7 43.0 - 80.0 %    Immature Granulocytes % 0.4 0.0 - 5.0 %    Lymphocytes % 26.7 20.0 - 42.0 %    Monocytes % 6.6 2.0 - 12.0 %    Eosinophils % 1.2 0.0 - 6.0 %    Basophils % 0.4 0.0 - 2.0 %    Neutrophils # 3.35 1.80 - 7.30 E9/L    Immature Granulocytes # 0.02 E9/L    Lymphocytes # 1.38 (L) 1.50 - 4.00 E9/L    Monocytes # 0.34 0.10 - 0.95 E9/L    Eosinophils # 0.06 0.05 - 0.50 E9/L    Basophils # 0.02 0.00 - 0.20 E9/L   Comprehensive Metabolic Panel   Result Value Ref Range    Sodium 141 132 - 146 mmol/L    Potassium 4.6 3.5 - 5.0 mmol/L    Chloride 104 98 - 107 mmol/L    CO2 25 22 - 29 mmol/L    Anion Gap 12 7 - 16 mmol/L    Glucose 153 (H) 74 - 99 mg/dL    BUN 19 8 - 23 mg/dL    CREATININE 1.0 0.5 - 1.0 mg/dL    GFR Non-African American 52 >=60 mL/min/1.73    GFR African American >60     Calcium 10.3 (H) 8.6 - 10.2 mg/dL    Total Protein 7.3 6.4 - 8.3 g/dL    Alb 4.1 3.5 - 5.2 g/dL    Total Bilirubin 0.9 0.0 - 1.2 mg/dL    Alkaline Phosphatase 61 35 - 104 U/L ALT 6 0 - 32 U/L    AST 34 (H) 0 - 31 U/L   Lactic Acid, Plasma   Result Value Ref Range    Lactic Acid 1.9 0.5 - 2.2 mmol/L   Protime-INR   Result Value Ref Range    Protime 25.9 (H) 9.3 - 12.4 sec    INR 2.3        Imaging: All Radiology results interpreted by Radiologist unless otherwise noted. No orders to display     ED Course / Medical Decision Making     Medications   0.9 % sodium chloride bolus (1,000 mLs Intravenous New Bag 4/27/19 1005)   ceFAZolin (ANCEF) 1 g in dextrose 4 % 50 mL IVPB (duplex (1 g Intravenous New Bag 4/27/19 1047)        Re-examination:  4/27/19       Time: 11:04    Patients symptoms are improving. Consults:   None    Procedures:   none    Medical Decision Making:   Patient has no focal neurological findings indicates normal orthostatics negative Patient is well appearing, non toxic and appropriate for outpatient management. Plan is for symptom management and PCP follow up. Counseling: The emergency provider has spoken with the patient, friend and family member patient, friend and brother and discussed todays results, in addition to providing specific details for the plan of care and counseling regarding the diagnosis and prognosis. Questions are answered at this time and they are agreeable with the plan. Assessment      1. Urinary tract infection with hematuria, site unspecified    2. Current use of long term anticoagulation      Plan   Disposition: Discharge to home  Patient condition is good    New Medications     New Prescriptions    CEPHALEXIN (KEFLEX) 500 MG CAPSULE    Take 1 capsule by mouth 3 times daily for 7 days     Electronically signed by Rupert Wells MD   DD: 4/27/19  **This report was transcribed using voice recognition software. Every effort was made to ensure accuracy; however, inadvertent computerized transcription errors may be present.   END OF ED PROVIDER NOTE            Rupert Wells MD  04/27/19 4679

## 2019-04-27 NOTE — ED NOTES
Pt up to bathroom with assistance, no blood noted on pt's pad, blood noted in toilet -minimal amt, blood noted on toilet paper with wiping-scant amt, no clots noted.      Dorinda Hopkins RN  04/27/19 3524

## 2019-04-29 LAB
ORGANISM: ABNORMAL
URINE CULTURE, ROUTINE: ABNORMAL
URINE CULTURE, ROUTINE: ABNORMAL

## 2019-05-01 ENCOUNTER — HOSPITAL ENCOUNTER (EMERGENCY)
Age: 84
Discharge: HOME | DRG: 563 | End: 2019-05-01
Attending: EMERGENCY MEDICINE
Payer: MEDICARE

## 2019-05-01 ENCOUNTER — APPOINTMENT (OUTPATIENT)
Dept: CT IMAGING | Age: 84
DRG: 563 | End: 2019-05-01
Payer: MEDICARE

## 2019-05-01 ENCOUNTER — APPOINTMENT (OUTPATIENT)
Dept: GENERAL RADIOLOGY | Age: 84
DRG: 563 | End: 2019-05-01
Payer: MEDICARE

## 2019-05-01 ENCOUNTER — HOSPITAL ENCOUNTER (INPATIENT)
Age: 84
LOS: 3 days | Discharge: NURSING, REHAB OR LONG-TERM CARE | DRG: 563 | End: 2019-05-05
Attending: EMERGENCY MEDICINE | Admitting: FAMILY MEDICINE
Payer: MEDICARE

## 2019-05-01 VITALS
DIASTOLIC BLOOD PRESSURE: 80 MMHG | BODY MASS INDEX: 20.41 KG/M2 | OXYGEN SATURATION: 98 % | HEIGHT: 66 IN | HEART RATE: 98 BPM | WEIGHT: 127 LBS | RESPIRATION RATE: 16 BRPM | SYSTOLIC BLOOD PRESSURE: 148 MMHG | TEMPERATURE: 98.1 F

## 2019-05-01 DIAGNOSIS — R40.4 TRANSIENT ALTERATION OF AWARENESS: ICD-10-CM

## 2019-05-01 DIAGNOSIS — N23 RENAL COLIC: ICD-10-CM
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
## 2019-05-01 DIAGNOSIS — S42.351A COMMINUTED FRACTURE OF HUMERUS, RIGHT, CLOSED, INITIAL ENCOUNTER: Primary | ICD-10-CM

## 2019-05-01 DIAGNOSIS — N20.0 KIDNEY STONE: Primary | ICD-10-CM

## 2019-05-01 DIAGNOSIS — R53.1 GENERALIZED WEAKNESS: ICD-10-CM

## 2019-05-01 LAB
ALBUMIN SERPL-MCNC: 3.6 G/DL (ref 3.5–5.2)
ALP BLD-CCNC: 61 U/L (ref 35–104)
ALT SERPL-CCNC: 6 U/L (ref 0–32)
ANION GAP SERPL CALCULATED.3IONS-SCNC: 11 MMOL/L (ref 7–16)
ANION GAP SERPL CALCULATED.3IONS-SCNC: 16 MMOL/L (ref 7–16)
AST SERPL-CCNC: 22 U/L (ref 0–31)
BACTERIA: ABNORMAL /HPF
BASOPHILS ABSOLUTE: 0.01 E9/L (ref 0–0.2)
BASOPHILS ABSOLUTE: 0.02 E9/L (ref 0–0.2)
BASOPHILS RELATIVE PERCENT: 0.1 % (ref 0–2)
BASOPHILS RELATIVE PERCENT: 0.3 % (ref 0–2)
BILIRUB SERPL-MCNC: 1.1 MG/DL (ref 0–1.2)
BILIRUBIN URINE: NEGATIVE
BLOOD, URINE: ABNORMAL
BUN BLDV-MCNC: 20 MG/DL (ref 8–23)
BUN BLDV-MCNC: 21 MG/DL (ref 8–23)
CALCIUM SERPL-MCNC: 10.1 MG/DL (ref 8.6–10.2)
CALCIUM SERPL-MCNC: 10.1 MG/DL (ref 8.6–10.2)
CHLORIDE BLD-SCNC: 103 MMOL/L (ref 98–107)
CHLORIDE BLD-SCNC: 107 MMOL/L (ref 98–107)
CLARITY: ABNORMAL
CO2: 22 MMOL/L (ref 22–29)
CO2: 24 MMOL/L (ref 22–29)
COLOR: YELLOW
CREAT SERPL-MCNC: 1.1 MG/DL (ref 0.5–1)
CREAT SERPL-MCNC: 1.3 MG/DL (ref 0.5–1)
EOSINOPHILS ABSOLUTE: 0.01 E9/L (ref 0.05–0.5)
EOSINOPHILS ABSOLUTE: 0.1 E9/L (ref 0.05–0.5)
EOSINOPHILS RELATIVE PERCENT: 0.1 % (ref 0–6)
EOSINOPHILS RELATIVE PERCENT: 1.4 % (ref 0–6)
EPITHELIAL CELLS, UA: ABNORMAL /HPF
GFR AFRICAN AMERICAN: 46
GFR AFRICAN AMERICAN: 56
GFR NON-AFRICAN AMERICAN: 38 ML/MIN/1.73
GFR NON-AFRICAN AMERICAN: 47 ML/MIN/1.73
GLUCOSE BLD-MCNC: 110 MG/DL (ref 74–99)
GLUCOSE BLD-MCNC: 131 MG/DL (ref 74–99)
GLUCOSE URINE: NEGATIVE MG/DL
HCT VFR BLD CALC: 41.2 % (ref 34–48)
HCT VFR BLD CALC: 46.5 % (ref 34–48)
HEMOGLOBIN: 13.6 G/DL (ref 11.5–15.5)
HEMOGLOBIN: 15.3 G/DL (ref 11.5–15.5)
IMMATURE GRANULOCYTES #: 0.03 E9/L
IMMATURE GRANULOCYTES #: 0.04 E9/L
IMMATURE GRANULOCYTES %: 0.4 % (ref 0–5)
IMMATURE GRANULOCYTES %: 0.5 % (ref 0–5)
INR BLD: 2.5
KETONES, URINE: ABNORMAL MG/DL
LEUKOCYTE ESTERASE, URINE: NEGATIVE
LIPASE: 33 U/L (ref 13–60)
LYMPHOCYTES ABSOLUTE: 0.61 E9/L (ref 1.5–4)
LYMPHOCYTES ABSOLUTE: 1.23 E9/L (ref 1.5–4)
LYMPHOCYTES RELATIVE PERCENT: 17.1 % (ref 20–42)
LYMPHOCYTES RELATIVE PERCENT: 7.8 % (ref 20–42)
MCH RBC QN AUTO: 31.4 PG (ref 26–35)
MCH RBC QN AUTO: 31.5 PG (ref 26–35)
MCHC RBC AUTO-ENTMCNC: 32.9 % (ref 32–34.5)
MCHC RBC AUTO-ENTMCNC: 33 % (ref 32–34.5)
MCV RBC AUTO: 95.4 FL (ref 80–99.9)
MCV RBC AUTO: 95.5 FL (ref 80–99.9)
MONOCYTES ABSOLUTE: 0.66 E9/L (ref 0.1–0.95)
MONOCYTES ABSOLUTE: 0.8 E9/L (ref 0.1–0.95)
MONOCYTES RELATIVE PERCENT: 11.1 % (ref 2–12)
MONOCYTES RELATIVE PERCENT: 8.4 % (ref 2–12)
NEUTROPHILS ABSOLUTE: 5.01 E9/L (ref 1.8–7.3)
NEUTROPHILS ABSOLUTE: 6.5 E9/L (ref 1.8–7.3)
NEUTROPHILS RELATIVE PERCENT: 69.7 % (ref 43–80)
NEUTROPHILS RELATIVE PERCENT: 83.1 % (ref 43–80)
NITRITE, URINE: NEGATIVE
PDW BLD-RTO: 13.6 FL (ref 11.5–15)
PDW BLD-RTO: 13.7 FL (ref 11.5–15)
PH UA: 6.5 (ref 5–9)
PLATELET # BLD: 153 E9/L (ref 130–450)
PLATELET # BLD: 173 E9/L (ref 130–450)
PMV BLD AUTO: 9.7 FL (ref 7–12)
PMV BLD AUTO: 9.8 FL (ref 7–12)
POTASSIUM REFLEX MAGNESIUM: 3.9 MMOL/L (ref 3.5–5)
POTASSIUM REFLEX MAGNESIUM: 4.1 MMOL/L (ref 3.5–5)
PROTEIN UA: NEGATIVE MG/DL
PROTHROMBIN TIME: 28.5 SEC (ref 9.3–12.4)
RBC # BLD: 4.32 E12/L (ref 3.5–5.5)
RBC # BLD: 4.87 E12/L (ref 3.5–5.5)
RBC UA: >20 /HPF (ref 0–2)
REASON FOR REJECTION: NORMAL
REJECTED TEST: NORMAL
SODIUM BLD-SCNC: 141 MMOL/L (ref 132–146)
SODIUM BLD-SCNC: 142 MMOL/L (ref 132–146)
SPECIFIC GRAVITY UA: 1.01 (ref 1–1.03)
TOTAL PROTEIN: 6.7 G/DL (ref 6.4–8.3)
TROPONIN: <0.01 NG/ML (ref 0–0.03)
UROBILINOGEN, URINE: 0.2 E.U./DL
WBC # BLD: 7.2 E9/L (ref 4.5–11.5)
WBC # BLD: 7.8 E9/L (ref 4.5–11.5)
WBC UA: ABNORMAL /HPF (ref 0–5)

## 2019-05-01 PROCEDURE — 6360000002 HC RX W HCPCS: Performed by: EMERGENCY MEDICINE

## 2019-05-01 PROCEDURE — 2580000003 HC RX 258: Performed by: EMERGENCY MEDICINE

## 2019-05-01 PROCEDURE — 70450 CT HEAD/BRAIN W/O DYE: CPT

## 2019-05-01 PROCEDURE — 85025 COMPLETE CBC W/AUTO DIFF WBC: CPT

## 2019-05-01 PROCEDURE — 83690 ASSAY OF LIPASE: CPT

## 2019-05-01 PROCEDURE — 74176 CT ABD & PELVIS W/O CONTRAST: CPT

## 2019-05-01 PROCEDURE — 99284 EMERGENCY DEPT VISIT MOD MDM: CPT

## 2019-05-01 PROCEDURE — 85610 PROTHROMBIN TIME: CPT

## 2019-05-01 PROCEDURE — 81001 URINALYSIS AUTO W/SCOPE: CPT

## 2019-05-01 PROCEDURE — 73030 X-RAY EXAM OF SHOULDER: CPT

## 2019-05-01 PROCEDURE — 80048 BASIC METABOLIC PNL TOTAL CA: CPT

## 2019-05-01 PROCEDURE — 99285 EMERGENCY DEPT VISIT HI MDM: CPT

## 2019-05-01 PROCEDURE — 72125 CT NECK SPINE W/O DYE: CPT

## 2019-05-01 PROCEDURE — 36415 COLL VENOUS BLD VENIPUNCTURE: CPT

## 2019-05-01 PROCEDURE — 84484 ASSAY OF TROPONIN QUANT: CPT

## 2019-05-01 PROCEDURE — 96374 THER/PROPH/DIAG INJ IV PUSH: CPT

## 2019-05-01 PROCEDURE — 93005 ELECTROCARDIOGRAM TRACING: CPT | Performed by: EMERGENCY MEDICINE

## 2019-05-01 PROCEDURE — 80053 COMPREHEN METABOLIC PANEL: CPT

## 2019-05-01 RX ORDER — OXYCODONE HYDROCHLORIDE 5 MG/1
5 TABLET ORAL EVERY 6 HOURS PRN
Qty: 12 TABLET | Refills: 0 | Status: ON HOLD | OUTPATIENT
Start: 2019-05-01 | End: 2019-05-05 | Stop reason: HOSPADM

## 2019-05-01 RX ORDER — TAMSULOSIN HYDROCHLORIDE 0.4 MG/1
0.4 CAPSULE ORAL DAILY
Qty: 5 CAPSULE | Refills: 0 | Status: SHIPPED | OUTPATIENT
Start: 2019-05-01 | End: 2019-06-03

## 2019-05-01 RX ORDER — KETOROLAC TROMETHAMINE 30 MG/ML
15 INJECTION, SOLUTION INTRAMUSCULAR; INTRAVENOUS ONCE
Status: COMPLETED | OUTPATIENT
Start: 2019-05-01 | End: 2019-05-01

## 2019-05-01 RX ORDER — SODIUM CHLORIDE 0.9 % (FLUSH) 0.9 %
10 SYRINGE (ML) INJECTION PRN
Status: DISCONTINUED | OUTPATIENT
Start: 2019-05-01 | End: 2019-05-01 | Stop reason: HOSPADM

## 2019-05-01 RX ORDER — 0.9 % SODIUM CHLORIDE 0.9 %
1000 INTRAVENOUS SOLUTION INTRAVENOUS ONCE
Status: COMPLETED | OUTPATIENT
Start: 2019-05-01 | End: 2019-05-01

## 2019-05-01 RX ORDER — NAPROXEN 500 MG/1
250 TABLET ORAL 2 TIMES DAILY
Qty: 30 TABLET | Refills: 0 | Status: SHIPPED | OUTPATIENT
Start: 2019-05-01 | End: 2019-06-03

## 2019-05-01 RX ADMIN — SODIUM CHLORIDE 1000 ML: 9 INJECTION, SOLUTION INTRAVENOUS at 16:45

## 2019-05-01 RX ADMIN — KETOROLAC TROMETHAMINE 15 MG: 30 INJECTION, SOLUTION INTRAMUSCULAR; INTRAVENOUS at 17:06

## 2019-05-01 ASSESSMENT — PAIN DESCRIPTION - PAIN TYPE
TYPE: ACUTE PAIN

## 2019-05-01 ASSESSMENT — PAIN DESCRIPTION - DESCRIPTORS
DESCRIPTORS: ACHING
DESCRIPTORS: DISCOMFORT
DESCRIPTORS: ACHING

## 2019-05-01 ASSESSMENT — ENCOUNTER SYMPTOMS
VISUAL CHANGE: 0
SORE THROAT: 0
NAUSEA: 0
SHORTNESS OF BREATH: 0
COUGH: 0
BACK PAIN: 0
COLOR CHANGE: 0
ABDOMINAL PAIN: 0
VOMITING: 0

## 2019-05-01 ASSESSMENT — PAIN SCALES - GENERAL
PAINLEVEL_OUTOF10: 4
PAINLEVEL_OUTOF10: 3
PAINLEVEL_OUTOF10: 5
PAINLEVEL_OUTOF10: 5

## 2019-05-01 ASSESSMENT — PAIN DESCRIPTION - LOCATION
LOCATION: ABDOMEN
LOCATION: ABDOMEN
LOCATION: SHOULDER

## 2019-05-01 ASSESSMENT — PAIN DESCRIPTION - FREQUENCY
FREQUENCY: CONTINUOUS

## 2019-05-01 ASSESSMENT — PAIN DESCRIPTION - PROGRESSION
CLINICAL_PROGRESSION: GRADUALLY IMPROVING
CLINICAL_PROGRESSION: GRADUALLY WORSENING

## 2019-05-01 ASSESSMENT — PAIN DESCRIPTION - ORIENTATION
ORIENTATION: RIGHT
ORIENTATION: LEFT;LOWER
ORIENTATION: LEFT;LOWER
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
## 2019-05-02 PROBLEM — N20.0 KIDNEY STONE: Status: ACTIVE | Noted: 2019-05-02

## 2019-05-02 LAB
EKG ATRIAL RATE: 120 BPM
EKG Q-T INTERVAL: 398 MS
EKG QRS DURATION: 94 MS
EKG QTC CALCULATION (BAZETT): 467 MS
EKG R AXIS: 37 DEGREES
EKG T AXIS: -95 DEGREES
EKG VENTRICULAR RATE: 83 BPM

## 2019-05-02 PROCEDURE — 93010 ELECTROCARDIOGRAM REPORT: CPT | Performed by: INTERNAL MEDICINE

## 2019-05-02 PROCEDURE — 2580000003 HC RX 258: Performed by: UROLOGY

## 2019-05-02 PROCEDURE — 1200000000 HC SEMI PRIVATE

## 2019-05-02 PROCEDURE — 6370000000 HC RX 637 (ALT 250 FOR IP): Performed by: FAMILY MEDICINE

## 2019-05-02 PROCEDURE — 2580000003 HC RX 258: Performed by: FAMILY MEDICINE

## 2019-05-02 PROCEDURE — 99221 1ST HOSP IP/OBS SF/LOW 40: CPT | Performed by: ORTHOPAEDIC SURGERY

## 2019-05-02 RX ORDER — ACETAMINOPHEN 325 MG/1
650 TABLET ORAL EVERY 4 HOURS PRN
Status: DISCONTINUED | OUTPATIENT
Start: 2019-05-02 | End: 2019-05-05 | Stop reason: HOSPADM

## 2019-05-02 RX ORDER — METOPROLOL SUCCINATE 50 MG/1
50 TABLET, EXTENDED RELEASE ORAL DAILY
Status: DISCONTINUED | OUTPATIENT
Start: 2019-05-02 | End: 2019-05-05 | Stop reason: HOSPADM

## 2019-05-02 RX ORDER — WARFARIN SODIUM 4 MG/1
4 TABLET ORAL DAILY
Status: DISCONTINUED | OUTPATIENT
Start: 2019-05-02 | End: 2019-05-05 | Stop reason: HOSPADM

## 2019-05-02 RX ORDER — ONDANSETRON 2 MG/ML
4 INJECTION INTRAMUSCULAR; INTRAVENOUS EVERY 6 HOURS PRN
Status: DISCONTINUED | OUTPATIENT
Start: 2019-05-02 | End: 2019-05-05 | Stop reason: HOSPADM

## 2019-05-02 RX ORDER — SODIUM CHLORIDE 0.9 % (FLUSH) 0.9 %
10 SYRINGE (ML) INJECTION EVERY 12 HOURS SCHEDULED
Status: DISCONTINUED | OUTPATIENT
Start: 2019-05-02 | End: 2019-05-05 | Stop reason: HOSPADM

## 2019-05-02 RX ORDER — TAMSULOSIN HYDROCHLORIDE 0.4 MG/1
0.4 CAPSULE ORAL DAILY
Status: DISCONTINUED | OUTPATIENT
Start: 2019-05-02 | End: 2019-05-05 | Stop reason: HOSPADM

## 2019-05-02 RX ORDER — SODIUM CHLORIDE 0.9 % (FLUSH) 0.9 %
10 SYRINGE (ML) INJECTION PRN
Status: DISCONTINUED | OUTPATIENT
Start: 2019-05-02 | End: 2019-05-05 | Stop reason: HOSPADM

## 2019-05-02 RX ORDER — FUROSEMIDE 20 MG/1
10 TABLET ORAL DAILY
Status: DISCONTINUED | OUTPATIENT
Start: 2019-05-02 | End: 2019-05-05 | Stop reason: HOSPADM

## 2019-05-02 RX ORDER — SODIUM CHLORIDE, SODIUM LACTATE, POTASSIUM CHLORIDE, CALCIUM CHLORIDE 600; 310; 30; 20 MG/100ML; MG/100ML; MG/100ML; MG/100ML
INJECTION, SOLUTION INTRAVENOUS CONTINUOUS
Status: DISCONTINUED | OUTPATIENT
Start: 2019-05-02 | End: 2019-05-05

## 2019-05-02 RX ORDER — LEVOTHYROXINE SODIUM 0.05 MG/1
50 TABLET ORAL DAILY
Status: DISCONTINUED | OUTPATIENT
Start: 2019-05-02 | End: 2019-05-05 | Stop reason: HOSPADM

## 2019-05-02 RX ADMIN — FUROSEMIDE 10 MG: 20 TABLET ORAL at 08:55

## 2019-05-02 RX ADMIN — VITAMIN D, TAB 1000IU (100/BT) 3000 UNITS: 25 TAB at 08:55

## 2019-05-02 RX ADMIN — LEVOTHYROXINE SODIUM 50 MCG: 50 TABLET ORAL at 08:55

## 2019-05-02 RX ADMIN — Medication 10 ML: at 08:55

## 2019-05-02 RX ADMIN — TAMSULOSIN HYDROCHLORIDE 0.4 MG: 0.4 CAPSULE ORAL at 08:55

## 2019-05-02 RX ADMIN — WARFARIN SODIUM 4 MG: 4 TABLET ORAL at 19:09

## 2019-05-02 RX ADMIN — SODIUM CHLORIDE, POTASSIUM CHLORIDE, SODIUM LACTATE AND CALCIUM CHLORIDE: 600; 310; 30; 20 INJECTION, SOLUTION INTRAVENOUS at 14:05

## 2019-05-02 ASSESSMENT — PAIN SCALES - GENERAL
PAINLEVEL_OUTOF10: 0

## 2019-05-02 NOTE — ED PROVIDER NOTES
Payal Maki is a 80 y.o. female with PMH significant for A. Fib on anticoagulation, CAD and CKD  presenting to the emergency department for Fall. She reports around hour prior to arrival, she was walking in her kitchen when she slipped on rug and landed on her right shoulder. She complains of pain and swelling to her right shoulder. Patient denies any previous injury to that shoulder. Patient denies any other pain. Patient states this fall was witnessed by her friend. Patient denies hitting her head or any loss of consciousness. Patient does admit that she takes Coumadin for atrial fibrillation. The history is provided by the patient. Fall   The accident occurred less than 1 hour ago. The fall occurred while walking. She landed on a hard floor. The point of impact was the right shoulder. The pain is at a severity of 4/10. The pain is mild. She was ambulatory at the scene. There was no entrapment after the fall. There was no drug use involved in the accident. There was no alcohol use involved in the accident. Pertinent negatives include no visual change, no fever, no numbness, no abdominal pain, no nausea, no vomiting, no headaches, no loss of consciousness and no tingling. The symptoms are aggravated by activity. Review of Systems   Constitutional: Negative for chills and fever. HENT: Negative for congestion and sore throat. Eyes: Negative for visual disturbance. Respiratory: Negative for cough and shortness of breath. Cardiovascular: Negative for chest pain. Gastrointestinal: Negative for abdominal pain, nausea and vomiting. Genitourinary: Negative for dysuria and menstrual problem. Musculoskeletal: Positive for arthralgias (right shoulder) and joint swelling (right shoulder). Negative for back pain and neck pain. Skin: Negative for color change. Neurological: Negative for tingling, loss of consciousness, numbness and headaches. Psychiatric/Behavioral: Negative for confusion. Physical Exam   Constitutional: She is oriented to person, place, and time. She appears well-developed and well-nourished. No distress. HENT:   Head: Normocephalic and atraumatic. Nose: Nose normal.   Mouth/Throat: Oropharynx is clear and moist and mucous membranes are normal.   Eyes: Conjunctivae are normal.   Neck: Normal range of motion. Neck supple. Cardiovascular: Normal rate and regular rhythm. Pulses:       Radial pulses are 2+ on the right side, and 2+ on the left side. Dorsalis pedis pulses are 2+ on the right side, and 2+ on the left side. Pulmonary/Chest: Effort normal and breath sounds normal. She has no wheezes. She has no rhonchi. She has no rales. Abdominal: Soft. Bowel sounds are normal. There is no tenderness. There is no rigidity, no rebound and no guarding. Musculoskeletal:        Right shoulder: She exhibits decreased range of motion, tenderness, swelling and deformity. Cervical back: She exhibits no tenderness. Pain to palpation in area noted, otherwise no pain to palpation in head to toe. Neurological: She is alert and oriented to person, place, and time. No cranial nerve deficit (CN II-XII grossly intact) or sensory deficit. She exhibits normal muscle tone. Skin: Skin is warm and dry. Capillary refill takes less than 2 seconds. Psychiatric: She has a normal mood and affect. Her speech is normal.   Nursing note and vitals reviewed. Procedures    MDM  Number of Diagnoses or Management Options  Comminuted fracture of humerus, right, closed, initial encounter:   Fall, initial encounter:   Generalized weakness:   Transient alteration of awareness:   Diagnosis management comments: Patient presents to the ED for fall. We initially obtained blood work, imaging and ekg. Workup in the ED revealed right proximal humerus fracture. Patient had therapeutic INR. CT Head unremarkable.  Family at bedside states they are concerned as patient lives home alone and has been more confused as of late. They do not feel patient is safe to go home. Patient requires continued workup and management of their symptoms and will be admitted to the hospital for further evaluation and treatment. Patient agrees with the plan and all questions were answered. ----------------------------------------------- PAST HISTORY --------------------------------------------  Past Medical History:  has a past medical history of Afib ([facility] Allergic rhinitis, Anticoagulant long-term use, Atrial flutter by electrocardiogram ([facility] CAD (coronary artery disease), CKD (chronic kidney disease), stage III ([facility] HTN (hypertension), Hyperlipidemia, Hypothyroidism, MI (myocardial infarction) ([facility] Shingles, and Urinary incontinence. Past Surgical History:  has a past surgical history that includes Cardiac catheterization (06/04/2012); ECHO Compl W Dop Color Flow (6/5/2012); Coronary artery bypass graft (6/6/12); ablation of dysrhythmic focus (6/6/12); Cardioversion (10/21/2014); A-V cardiac pacemaker insertion (11/4/2014); Colonoscopy; eye surgery; Upper gastrointestinal endoscopy; and pacemaker placement. Social History:  reports that she quit smoking about 47 years ago. Her smoking use included cigarettes. She has a 5.00 pack-year smoking history. She has never used smokeless tobacco. She reports that she does not drink alcohol or use drugs. Family History: family history includes Stroke in her mother. The patients home medications have been reviewed.     Allergies: Codeine    ------------------------------------------------ RESULTS ---------------------------------------------------    LABS:  Results for orders placed or performed during the hospital encounter of 05/01/19   CBC Auto Differential   Result Value Ref Range    WBC 7.8 4.5 - 11.5 E9/L    RBC 4.32 3.50 - 5.50 E12/L    Hemoglobin 13.6 11.5 - 15.5 g/dL    Hematocrit 41.2 34.0 - 48.0 %    MCV 95.4 80.0 - 99.9 fL    MCH 31.5 26.0 - 35.0 pg    MCHC 33.0 32.0 - 34.5 %    RDW 13.7 11.5 - 15.0 fL    Platelets 627 400 - 889 E9/L    MPV 9.7 7.0 - 12.0 fL    Neutrophils % 83.1 (H) 43.0 - 80.0 %    Immature Granulocytes % 0.5 0.0 - 5.0 %    Lymphocytes % 7.8 (L) 20.0 - 42.0 %    Monocytes % 8.4 2.0 - 12.0 %    Eosinophils % 0.1 0.0 - 6.0 %    Basophils % 0.1 0.0 - 2.0 %    Neutrophils # 6.50 1.80 - 7.30 E9/L    Immature Granulocytes # 0.04 E9/L    Lymphocytes # 0.61 (L) 1.50 - 4.00 E9/L    Monocytes # 0.66 0.10 - 0.95 E9/L    Eosinophils # 0.01 (L) 0.05 - 0.50 E9/L    Basophils # 0.01 0.00 - 0.20 E9/L   Protime-INR   Result Value Ref Range    Protime 28.5 (H) 9.3 - 12.4 sec    INR 2.5    Troponin   Result Value Ref Range    Troponin <0.01 0.00 - 0.03 ng/mL   Comprehensive Metabolic Panel w/ Reflex to MG   Result Value Ref Range    Sodium 142 132 - 146 mmol/L    Potassium reflex Magnesium 3.9 3.5 - 5.0 mmol/L    Chloride 107 98 - 107 mmol/L    CO2 24 22 - 29 mmol/L    Anion Gap 11 7 - 16 mmol/L    Glucose 131 (H) 74 - 99 mg/dL    BUN 20 8 - 23 mg/dL    CREATININE 1.1 (H) 0.5 - 1.0 mg/dL    GFR Non-African American 47 >=60 mL/min/1.73    GFR African American 56     Calcium 10.1 8.6 - 10.2 mg/dL    Total Protein 6.7 6.4 - 8.3 g/dL    Alb 3.6 3.5 - 5.2 g/dL    Total Bilirubin 1.1 0.0 - 1.2 mg/dL    Alkaline Phosphatase 61 35 - 104 U/L    ALT 6 0 - 32 U/L    AST 22 0 - 31 U/L   EKG 12 Lead   Result Value Ref Range    Ventricular Rate 83 BPM    Atrial Rate 120 BPM    QRS Duration 94 ms    Q-T Interval 398 ms    QTc Calculation (Bazett) 467 ms    R Axis 37 degrees    T Axis -95 degrees       RADIOLOGY:    All Radiology results interpreted by Radiologist unless otherwise noted. CT Head WO Contrast   Final Result   No indication for an acute intracranial process. CT Cervical Spine WO Contrast   Final Result   1. No acute fractures or dislocations in the cervical spine. Vertebral   Degenerative changes in multiple levels as above commented.       XR SHOULDER RIGHT (MIN 2 VIEWS)   Final Result      Comminuted fracture of proximal humerus extending into the humeral   head. EKG: This EKG is signed and interpreted by ED Physician. Time:  2107  Rate: 83  Rhythm: Sinus. Interpretation: atrial fibrillation (chronic). Comparison: stable as compared to patient's most recent EKG.    ---------------------------- NURSING NOTES AND VITALS REVIEWED -------------------------   The nursing notes within the ED encounter and vital signs as below have been reviewed. /73   Pulse 62   Temp 98 °F (36.7 °C) (Temporal)   Resp 16   Ht 5' 6\" (1.676 m)   Wt 134 lb 6.4 oz (61 kg)   SpO2 97%   BMI 21.69 kg/m²   Oxygen Saturation Interpretation: Normal      ------------------------------------------PROGRESS NOTES -------------------------------------------    ED COURSE MEDICATIONS:                Medications   vitamin D (CHOLECALCIFEROL) tablet 3,000 Units (3,000 Units Oral Given 5/2/19 0855)   furosemide (LASIX) tablet 10 mg (10 mg Oral Given 5/2/19 0855)   levothyroxine (SYNTHROID) tablet 50 mcg (50 mcg Oral Given 5/2/19 0855)   metoprolol succinate (TOPROL XL) extended release tablet 50 mg (50 mg Oral Not Given 5/2/19 0905)   tamsulosin (FLOMAX) capsule 0.4 mg (0.4 mg Oral Given 5/2/19 0855)   warfarin (COUMADIN) tablet 4 mg (has no administration in time range)   sodium chloride flush 0.9 % injection 10 mL (10 mLs Intravenous Given 5/2/19 0855)   sodium chloride flush 0.9 % injection 10 mL (has no administration in time range)   magnesium hydroxide (MILK OF MAGNESIA) 400 MG/5ML suspension 30 mL (has no administration in time range)   ondansetron (ZOFRAN) injection 4 mg (has no administration in time range)   acetaminophen (TYLENOL) tablet 650 mg (has no administration in time range)       CONSULTATIONS:           (Patel) Medicine- admission . PROCEDURES:            none.       COUNSELING:   I have spoken with the patient and discussed todays results, in

## 2019-05-02 NOTE — ED NOTES
Bed: 11  Expected date:   Expected time:   Means of arrival:   Comments:  [address] ARANDA RN  05/01/19 2012

## 2019-05-02 NOTE — CARE COORDINATION
Met w/ pt to discuss transition of care and discharge planning. Pt lives alone in a one story home. Pt unsure of needs at discharge at this time. Pt.was given list of skilled nursing facilities. Will continue to follow.  [facility]

## 2019-05-02 NOTE — CONSULTS
Oral, Daily  warfarin (COUMADIN) tablet 4 mg, 4 mg, Oral, Daily  sodium chloride flush 0.9 % injection 10 mL, 10 mL, Intravenous, 2 times per day  sodium chloride flush 0.9 % injection 10 mL, 10 mL, Intravenous, PRN  magnesium hydroxide (MILK OF MAGNESIA) 400 MG/5ML suspension 30 mL, 30 mL, Oral, Daily PRN  ondansetron (ZOFRAN) injection 4 mg, 4 mg, Intravenous, Q6H PRN  acetaminophen (TYLENOL) tablet 650 mg, 650 mg, Oral, Q4H PRN  Allergies:  Codeine    Social History:   TOBACCO:   reports that she quit smoking about 47 years ago. Her smoking use included cigarettes. She has a 5.00 pack-year smoking history. She has never used smokeless tobacco.  ETOH:   reports that she does not drink alcohol. DRUGS:   reports that she does not use drugs.   ACTIVITIES OF DAILY LIVING:    OCCUPATION:    Family History:       Problem Relation Age of Onset    Stroke Mother        REVIEW OF SYSTEMS:  CONSTITUTIONAL:  negative for  fevers, chills  EYES:  negative for blurred vision, visual disturbance  HEENT:  negative for  hearing loss, voice change  RESPIRATORY:  negative for  dyspnea, wheezing  CARDIOVASCULAR:  negative for  chest pain, palpitations  GASTROINTESTINAL:  negative for nausea, vomiting  GENITOURINARY:  negative for frequency, urinary incontinence  HEMATOLOGIC/LYMPHATIC:  negative for bleeding and petechiae  MUSCULOSKELETAL:  positive for  Pain right shoulder  NEUROLOGICAL:  negative for headaches, dizziness  BEHAVIOR/PSYCH:  negative for increased agitation and anxiety    PHYSICAL EXAM:    VITALS:  /73   Pulse 62   Temp 98 °F (36.7 °C) (Temporal)   Resp 16   Ht 5' 6\" (1.676 m)   Wt 134 lb 6.4 oz (61 kg)   SpO2 97%   BMI 21.69 kg/m²   CONSTITUTIONAL:  awake, alert, cooperative, no apparent distress, and appears stated age  MUSCULOSKELETAL:  Right upper Extremity:  · Skin intact circumferentially  · Sensation intact to light touch in median, ulnar, radial, ulnar nerve distributions of the hand  · + AIN, PIN, ulnar motor function to the hand  · Patient able to flex and extend the fingers, wrist, elbow  · Unable to move the shoulder secondary oblique pain  · + TTP diffusely about the shoulder  · No tenderness to palpation about the fingers, wrist, forearm, elbow. · Moderate swelling to the right shoulder  · Compartment soft and compressible    Secondary Exam:   · leftUE: No obvious signs of trauma. -TTP to fingers, hand, wrist, forearm, elbow, humerus, shoulder or clavicle. -- Patient able to flex/extend fingers, wrist, elbow and shoulder with active and passive ROM without pain, +2/4 Radial pulse, cap refill <3sec, +AIN/PIN/Radial/Ulnar/Median N, distal sensation grossly intact to C4-T1 dermatomes, compartments soft and compressible. · bilateralLE: No obvious signs of trauma. -TTP to foot, ankle, leg, knee, thigh, hip.-- Patient able to flex/extend toes, ankle, knee and hip with active and passive ROM without pain,+2/4 DP & PT pulses, cap refill <3sec, +5/5 PF/DF/EHL, distal sensation grossly intact to L4-S1 dermatomes, compartments soft and compressible.     · Pelvis: -TTP, -Log roll, -Heel strike     DATA:    CBC:   Lab Results   Component Value Date    WBC 7.8 05/01/2019    RBC 4.32 05/01/2019    HGB 13.6 05/01/2019    HCT 41.2 05/01/2019    MCV 95.4 05/01/2019    MCH 31.5 05/01/2019    MCHC 33.0 05/01/2019    RDW 13.7 05/01/2019     05/01/2019    MPV 9.7 05/01/2019     PT/INR:    Lab Results   Component Value Date    PROTIME 28.5 05/01/2019    PROTIME 28.4 05/11/2018    INR 2.5 05/01/2019       Radiology Review:  X-ray right shoulder  Proximal humerus fracture with fracture through the surgical neck and through the greater tuberosity    IMPRESSION:  · Right proximal humerus fracture    PLAN:  · Nonweightbearing to the right upper extremity  · Sling for comfort  · Pain control  · Ice to right shoulder  · No acute orthopedic intervention at this time  · Patient may follow up in office with  Brooks Chavis  · Discussed with Dr. Brooks Chavis      I have seen and evaluated the patient and agree with the above assessment and plan on today's visit. I have performed the key components of the history and physical examination with significant findings of right proximal humerus fracture. Nonoperative management. NWB. Follow up office 10-14 days. I concur with the findings and plan as documented.     Sonido Diaz MD  5/2/2019

## 2019-05-02 NOTE — DISCHARGE INSTR - COC
Continuity of Care Form    Patient Name: Devin Cuevas   :  6/15/1928  MRN:  [id number]    Admit date:  2019  Discharge date:      Code Status Order: Full Code   Advance Directives:   [facility] Documentation     Date/Time Healthcare Directive Type of Healthcare Directive Copy in [address] Agent's Name Healthcare Agent's Phone Number    [phone]  Yes, patient has an advance directive for healthcare treatment  Living will;Durable power of  for health care  No, copy requested from family  Adult Children  --  --          Admitting Physician:  Tammy Cuevas MD  PCP: Tammy Cuevas MD    Discharging Nurse: [facility] Unit/Room#: 3387/0324-Q  Discharging Unit Phone Number: [phone]    Emergency Contact:   Extended Emergency Contact Information  Primary Emergency Contact: Ignacia Vizcaino  Address: unknown           Logan Franco [address] Phone: [phone]  Work Phone: [phone]  Mobile Phone: [phone]  Relation: Brother/Sister  Hearing or visual needs: None  Other needs: None  Preferred language: English   needed? No    Past Surgical History:  Past Surgical History:   Procedure Laterality Date    A-V CARDIAC PACEMAKER INSERTION  2014    Dr Tavarez Camera DYSRHYTHMIC FOCUS  12    modified maze procedure with LA excision     CARDIAC CATHETERIZATION  2012    Dr. Leo Montague  10/21/2014    Dr Castillo Pair:  200joules = SR    COLONOSCOPY      CORONARY ARTERY BYPASS GRAFT  12    4 vessel / Dr. Anthony Wen  2012         EYE SURGERY      PACEMAKER PLACEMENT      UPPER GASTROINTESTINAL ENDOSCOPY         Immunization History: There is no immunization history on file for this patient.     Active Problems:  Patient Active Problem List   Diagnosis Code    Stress F43.9    STEMI (ST elevation myocardial infarction) ([facility] [address] I21.3    HTN (hypertension) I10    Intermittent atrial fibrillation (HCC) I48.0    Candidal skin infection B37.2    Subtherapeutic international normalized ratio (INR) R79.1    Afib (HCC) I48.91    Presence of permanent cardiac pacemaker Z95.0    Coronary artery disease involving native coronary artery without angina pectoris I25.10    Vitamin D deficiency E55.9    Hypothyroidism E03.9    CKD (chronic kidney disease), stage III (HCC) N18.3    VHD (valvular heart disease) I38    Hx of CABG Z95.1    Fall W19. Susan Challenger    Kidney stone N20.0       Isolation/Infection:   Isolation          No Isolation            Nurse Assessment:  Last Vital Signs: /73   Pulse 62   Temp 98 °F (36.7 °C) (Temporal)   Resp 16   Ht 5' 6\" (1.676 m)   Wt 134 lb 6.4 oz (61 kg)   SpO2 97%   BMI 21.69 kg/m²     Last documented pain score (0-10 scale): Pain Level: 0  Last Weight:   Wt Readings from Last 1 Encounters:   05/02/19 134 lb 6.4 oz (61 kg)     Mental Status:  alert and able to concentrate and follow conversation    IV Access:  - None    Nursing Mobility/ADLs:  Walking   Assisted  Transfer  Assisted  Bathing  Assisted  Dressing  Assisted  Toileting  Assisted  Feeding  Independent  Med Admin  Assisted  Med Delivery   whole    Wound Care Documentation and Therapy:  Incision 11/04/14 Chest Left (Active)   Number of days: 1640        Elimination:  Continence:   · Bowel: Yes  · Bladder: Yes  Urinary Catheter: None   Colostomy/Ileostomy/Ileal Conduit: No       Date of Last BM: 5/4    Intake/Output Summary (Last 24 hours) at 5/2/2019 1411  Last data filed at 5/2/2019 0900  Gross per 24 hour   Intake 240 ml   Output --   Net 240 ml     No intake/output data recorded. Safety Concerns:      At Risk for Falls    Impairments/Disabilities:      None    Nutrition Therapy:  Current Nutrition Therapy:   - Oral Diet:  General    Routes of Feeding: Oral  Liquids: ***  Daily Fluid Restriction: no  Last Modified Barium Swallow with Video (Video Swallowing Test): not done    Treatments at the Time of Hospital Discharge:   Respiratory Treatments: ***  Oxygen Therapy:  is not on home oxygen therapy. Ventilator:    - ***    Rehab Therapies: {THERAPEUTIC INTERVENTION:7801656851}  Weight Bearing Status/Restrictions: 50Lon Joaquin CC Weight Bearin}  Other Medical Equipment (for information only, NOT a DME order):  {EQUIPMENT:190662034}  Other Treatments: ***    Patient's personal belongings (please select all that are sent with patient):      RN SIGNATURE:  Electronically signed by Carlos Jimenez RN on 19 at 2:12 PM    CASE MANAGEMENT/SOCIAL WORK SECTION    Inpatient Status Date: ***    Readmission Risk Assessment Score:  Readmission Risk              Risk of Unplanned Readmission:        9           Discharging to Facility/ Agency   · Name: [address] [place] [address]  · Address:  · Phone:  · Fax:    Dialysis Facility (if applicable)   · Name:  · Address:  · Dialysis Schedule:  · Phone:  · Fax:    / signature: Electronically signed by LUKE Goff on 2019 at 2:12 PM    PHYSICIAN SECTION    Prognosis: {Prognosis:5866389404}    Condition at Discharge: Zulay Joaquin Patient Condition:581224771}    Rehab Potential (if transferring to Rehab): {Prognosis:4148822895}    Recommended Labs or Other Treatments After Discharge: ***    Physician Certification: I certify the above information and transfer of Luca Espinoza  is necessary for the continuing treatment of the diagnosis listed and that she requires [place] for less 30 days.      Update Admission H&P: No change in H&P    PHYSICIAN SIGNATURE:  Electronically signed by Bird Lomax MD on 19 at 1:50 PM

## 2019-05-02 NOTE — CONSULTS
in  26. PHYSICAL EXAMINATION:  GENERAL:  The patient is an alert, oriented, thin female who appears to  be comfortable. Her right arm is in a sling. ABDOMEN:  Soft. There are no palpable organs or masses present. IMPRESSION:  3 mm ureteral stone on the left causing intermittent pain. The patient is now asymptomatic. She is not febrile. PLAN:  To observe her for any further evidence of pain or passage of the  calculus. She is being evaluated by Orthopedic for evaluation of her  right arm fractures.             Rui Whitley MD    D: 05/02/2019 13:20:20       T: 05/02/2019 13:28:24     [email or web address]  Job#: [id number]     Doc#: [id number]    CC:

## 2019-05-02 NOTE — CARE COORDINATION
5/2/2019 social work transition of care  Sw followed up with pt for sierra choices 1) [address]) [facility] 3) [facility] accepted and will follow.   Electronically signed by LUKE Sarkar on 5/2/2019 at 2:10 PM

## 2019-05-02 NOTE — PROGRESS NOTES
Per ortho resident  patient is okay to eat and no surgery is planned they wish to follow up with her outpatient.

## 2019-05-02 NOTE — H&P
[address]                  [address] [facility], [address] [facility]                              HISTORY AND PHYSICAL    PATIENT NAME: Radha Otoole                          :        06/15/1928  MED REC NO:   [id number]                            ROOM:       5403  ACCOUNT NO:   [de-identified]                           ADMIT DATE: 2019  PROVIDER:     Patel Watson MD    CHIEF COMPLAINT:  Left hydronephrosis from kidney stone and right  fractured shoulder. HISTORY OF PRESENTING ILLNESS:  This is a 45-year-old who initially  presented to the emergency room the morning of admission with acute low  back and abdominal pain, was found to have a kidney stone with left  hydronephrosis. The patient was treated and discharged and then  promptly went home and fell and broke her right shoulder. She is  right-handed. She is admitted at this point. She is 80years old. She  does have heart disease. She is right-handed and she lives alone. RECENT AND PRESENT MEDICATIONS:  See med rec in the chart. PAST MEDICAL HISTORY:  Positive for mild coronary disease, paroxysmal  AFib, chronic anticoagulation, hypothyroidism, hypertension, chronic  kidney disease, recent right shoulder fracture and kidney stone with  left hydronephrosis. PAST SURGICAL HISTORY:  Pacemaker, coronary artery bypass graft,  ablation. SOCIAL HISTORY:  Quit smoking in 70. Does not drink. FAMILY MEDICAL HISTORY:  Noncontributory. REVIEW OF SYSTEMS:  ALLERGIES:  CODEINE. SKIN/LYMPHATICS:  Negative. CENTRAL NERVOUS SYSTEM:  Denies headache, blurred vision, dizziness,  double vision. CIRCULATORY:  Denies chest pain, orthopnea, PND at this point. DIGESTIVE:  Denies nausea, vomiting, diarrhea, hematochezia. GENITOURINARY:  Positive for left-sided hydronephrosis due to kidney  stone, although she seems to be asymptomatic at this point.   MUSCULOSKELETAL:  Recently fractured right shoulder. PHYSICAL EXAMINATION:  GENERAL:  She is lying in bed in mild distress. Right arm is in a  sling. VITAL SIGNS:  Temperature 98, pulse 62, respirations 16, pressure  126/73, O2 sats 97% on room air. SKIN:  Shows pallor but no jaundice. EYES:  Reveal no icterus. NECK:  Supple. CHEST:  Clear. HEART:  Irregular. ABDOMEN:  Soft at this point. EXTREMITIES:  Other than her right shoulder showed no cyanosis, clubbing  or edema. Right shoulder is currently in a sling, does have palpable  tenderness in that area. LABORATORIES:  CMP basically unremarkable. Troponin was normal.  Sugar  was 131. White count 7.8, hemoglobin 13.6. Her INR was therapeutic at  2.5. CT of the head was negative. CT of the neck showed no acute fractures. She does have arthritis. Right shoulder comminuted fracture of the  proximal humerus extending into the humeral head. CT of the abdomen,  mild hydronephrosis and edema in the left kidney due to obstructing 3 mm  stone in the proximal left ureter. There was also a lobulated mass in  the region of left adrenal gland abutting the stomach without change  from previous CT scan of 08/2017. DIAGNOSTIC IMPRESSION:  Right fractured shoulder, left kidney stone with  hydronephrosis. PLAN:  Ortho and Urology consults. Due to fact that she is 80 years  older with heart disease and she is right-handed and now with the  fractured right shoulder, I think she would benefit from inpatient  rehabilitation once discharged.         Caprice Diane MD    D: 05/02/2019 13:17:28       T: 05/02/2019 13:25:43     [email or web address]  Job#: [id number]     Doc#: [id number]    CC:

## 2019-05-02 NOTE — PROGRESS NOTES
OT BEDSIDE TREATMENT NOTE      Date:2019  Patient Name: Josep Daniel  MRN: [id number]  : 6/15/1928  Room: [facility]     OT orders received & appreciated, chart reviewed. Currently awaiting Ortho consult. Will follow. Thank you,  Erick Poon.  Molly, OTR/L   License #  [id number]

## 2019-05-02 NOTE — PROGRESS NOTES
Physical Therapy    Facility/Department: [facility]      NAME: Marilia Nath  : 6/15/1928  MRN: [id number]    Date of Service: 2019     PT order received. Await ortho consult prior to evaluation. Will check later.      Delano Kehr PT, DPT [id number]

## 2019-05-03 LAB
ALBUMIN SERPL-MCNC: 3.1 G/DL (ref 3.5–5.2)
ALP BLD-CCNC: 51 U/L (ref 35–104)
ALT SERPL-CCNC: <5 U/L (ref 0–32)
ANION GAP SERPL CALCULATED.3IONS-SCNC: 10 MMOL/L (ref 7–16)
AST SERPL-CCNC: 15 U/L (ref 0–31)
BASOPHILS ABSOLUTE: 0.01 E9/L (ref 0–0.2)
BASOPHILS RELATIVE PERCENT: 0.2 % (ref 0–2)
BILIRUB SERPL-MCNC: 1.2 MG/DL (ref 0–1.2)
BUN BLDV-MCNC: 13 MG/DL (ref 8–23)
CALCIUM SERPL-MCNC: 9.3 MG/DL (ref 8.6–10.2)
CHLORIDE BLD-SCNC: 106 MMOL/L (ref 98–107)
CO2: 25 MMOL/L (ref 22–29)
CREAT SERPL-MCNC: 1 MG/DL (ref 0.5–1)
EOSINOPHILS ABSOLUTE: 0.08 E9/L (ref 0.05–0.5)
EOSINOPHILS RELATIVE PERCENT: 1.3 % (ref 0–6)
GFR AFRICAN AMERICAN: >60
GFR NON-AFRICAN AMERICAN: 52 ML/MIN/1.73
GLUCOSE BLD-MCNC: 108 MG/DL (ref 74–99)
HCT VFR BLD CALC: 36.1 % (ref 34–48)
HEMOGLOBIN: 11.9 G/DL (ref 11.5–15.5)
IMMATURE GRANULOCYTES #: 0.03 E9/L
IMMATURE GRANULOCYTES %: 0.5 % (ref 0–5)
INR BLD: 1.9
LYMPHOCYTES ABSOLUTE: 0.85 E9/L (ref 1.5–4)
LYMPHOCYTES RELATIVE PERCENT: 14.1 % (ref 20–42)
MAGNESIUM: 1.8 MG/DL (ref 1.6–2.6)
MCH RBC QN AUTO: 31.6 PG (ref 26–35)
MCHC RBC AUTO-ENTMCNC: 33 % (ref 32–34.5)
MCV RBC AUTO: 96 FL (ref 80–99.9)
MONOCYTES ABSOLUTE: 0.72 E9/L (ref 0.1–0.95)
MONOCYTES RELATIVE PERCENT: 12 % (ref 2–12)
NEUTROPHILS ABSOLUTE: 4.33 E9/L (ref 1.8–7.3)
NEUTROPHILS RELATIVE PERCENT: 71.9 % (ref 43–80)
PDW BLD-RTO: 13.9 FL (ref 11.5–15)
PLATELET # BLD: 130 E9/L (ref 130–450)
PMV BLD AUTO: 9.7 FL (ref 7–12)
POTASSIUM REFLEX MAGNESIUM: 3.4 MMOL/L (ref 3.5–5)
PROTHROMBIN TIME: 21.8 SEC (ref 9.3–12.4)
RBC # BLD: 3.76 E12/L (ref 3.5–5.5)
SODIUM BLD-SCNC: 141 MMOL/L (ref 132–146)
TOTAL PROTEIN: 5.6 G/DL (ref 6.4–8.3)
WBC # BLD: 6 E9/L (ref 4.5–11.5)

## 2019-05-03 PROCEDURE — 1200000000 HC SEMI PRIVATE

## 2019-05-03 PROCEDURE — 83735 ASSAY OF MAGNESIUM: CPT

## 2019-05-03 PROCEDURE — 85610 PROTHROMBIN TIME: CPT

## 2019-05-03 PROCEDURE — 36415 COLL VENOUS BLD VENIPUNCTURE: CPT

## 2019-05-03 PROCEDURE — 6370000000 HC RX 637 (ALT 250 FOR IP): Performed by: FAMILY MEDICINE

## 2019-05-03 PROCEDURE — 87088 URINE BACTERIA CULTURE: CPT

## 2019-05-03 PROCEDURE — 97161 PT EVAL LOW COMPLEX 20 MIN: CPT

## 2019-05-03 PROCEDURE — 80053 COMPREHEN METABOLIC PANEL: CPT

## 2019-05-03 PROCEDURE — 97535 SELF CARE MNGMENT TRAINING: CPT

## 2019-05-03 PROCEDURE — 2580000003 HC RX 258: Performed by: UROLOGY

## 2019-05-03 PROCEDURE — 97165 OT EVAL LOW COMPLEX 30 MIN: CPT

## 2019-05-03 PROCEDURE — 85025 COMPLETE CBC W/AUTO DIFF WBC: CPT

## 2019-05-03 PROCEDURE — 97110 THERAPEUTIC EXERCISES: CPT

## 2019-05-03 RX ADMIN — LEVOTHYROXINE SODIUM 50 MCG: 50 TABLET ORAL at 06:54

## 2019-05-03 RX ADMIN — ACETAMINOPHEN 650 MG: 325 TABLET, FILM COATED ORAL at 15:18

## 2019-05-03 RX ADMIN — FUROSEMIDE 10 MG: 20 TABLET ORAL at 09:34

## 2019-05-03 RX ADMIN — SODIUM CHLORIDE, POTASSIUM CHLORIDE, SODIUM LACTATE AND CALCIUM CHLORIDE: 600; 310; 30; 20 INJECTION, SOLUTION INTRAVENOUS at 16:02

## 2019-05-03 RX ADMIN — METOPROLOL SUCCINATE 50 MG: 50 TABLET, FILM COATED, EXTENDED RELEASE ORAL at 20:20

## 2019-05-03 RX ADMIN — TAMSULOSIN HYDROCHLORIDE 0.4 MG: 0.4 CAPSULE ORAL at 09:34

## 2019-05-03 RX ADMIN — WARFARIN SODIUM 4 MG: 4 TABLET ORAL at 17:56

## 2019-05-03 RX ADMIN — VITAMIN D, TAB 1000IU (100/BT) 3000 UNITS: 25 TAB at 09:34

## 2019-05-03 ASSESSMENT — PAIN DESCRIPTION - PROGRESSION: CLINICAL_PROGRESSION: GRADUALLY IMPROVING

## 2019-05-03 ASSESSMENT — PAIN SCALES - GENERAL
PAINLEVEL_OUTOF10: 0
PAINLEVEL_OUTOF10: 5
PAINLEVEL_OUTOF10: 0
PAINLEVEL_OUTOF10: 3
PAINLEVEL_OUTOF10: 0

## 2019-05-03 ASSESSMENT — PAIN DESCRIPTION - ONSET: ONSET: ON-GOING

## 2019-05-03 ASSESSMENT — PAIN DESCRIPTION - PAIN TYPE: TYPE: ACUTE PAIN

## 2019-05-03 ASSESSMENT — PAIN DESCRIPTION - LOCATION: LOCATION: ABDOMEN

## 2019-05-03 ASSESSMENT — PAIN DESCRIPTION - FREQUENCY: FREQUENCY: CONTINUOUS

## 2019-05-03 ASSESSMENT — PAIN DESCRIPTION - ORIENTATION: ORIENTATION: LEFT

## 2019-05-03 ASSESSMENT — PAIN - FUNCTIONAL ASSESSMENT: PAIN_FUNCTIONAL_ASSESSMENT: PREVENTS OR INTERFERES SOME ACTIVE ACTIVITIES AND ADLS

## 2019-05-03 ASSESSMENT — PAIN DESCRIPTION - DESCRIPTORS: DESCRIPTORS: ACHING;DISCOMFORT

## 2019-05-03 NOTE — PROGRESS NOTES
5/3/2019 1:04 PM  Service: Urology  Group: [facility] (Hern[place]/Caro/Jasmine)    Laura   [id number]    Subjective:   Tmax of 100.3 last evening  Pt sitting in bed, alert and oriented  Pt complaining of intermittent left lower abdominal pain  Denies dysuria, complains of urge incontinence  Good appetite      Review of Systems  Constitutional: no chills, good appetite  Respiratory: negative for cough and shortness of breath  Cardiovascular: negative for chest pressure/discomfort  Gastrointestinal: positive for abdominal pain, negative for nausea and vomiting  Dermatologic: negative   Hematologic/lymphatic: negative  Musculoskeletal:positive for right arm pain   Neurological: negative for dizziness, headaches and seizures  Endocrine: negative for diabetic symptoms including none, polyuria and polydipsia  Psychiatric: negative   : See above    Scheduled Meds:   vitamin D  3,000 Units Oral Daily    furosemide  10 mg Oral Daily    levothyroxine  50 mcg Oral Daily    metoprolol succinate  50 mg Oral Daily    tamsulosin  0.4 mg Oral Daily    warfarin  4 mg Oral Daily    sodium chloride flush  10 mL Intravenous 2 times per day       Objective:  Vitals:    05/03/19 0815   BP: (!) 141/61   Pulse: 83   Resp: 17   Temp: 97.1 °F (36.2 °C)   SpO2: 96%         Allergies: Codeine    General Appearance: alert and oriented to person, place and time and in no acute distress  Skin: no rash or erythema  Head: normocephalic and atraumatic  Pulmonary/Chest: normal air movement, no respiratory distress  Abdomen: soft, non-tender, non-distended, normal bowel sounds, no masses or organomegaly and no inguinal adenopathy  Genitourinary: genitals normal without hernia or inguinal adenopathy  Extremities: no cyanosis, clubbing or edema   Musculoskeletal: Right arm in sling       Labs:     Recent Labs     05/03/19  0511      K 3.4*      CO2 25   BUN 13   CREATININE 1.0   GLUCOSE 108*   CALCIUM 9.3       Lab Results   Component sensitivity testing   is not routinely indicated and will not be performed. Mixed brandon isolated includes:   Mixed gram positive organisms    Organism Abnormal  04/27/2019 10:05 AM  - [address] [facility] Lab   Escherichia coli    Urine Culture, Routine 04/27/2019 10:05 AM  - [address] [place] [address] Lab   >100,000 CFU/ml          Assessment/Plan:  3 mm left ureteral stone   -Patient now complaining of intermittent left sided abdominal pain, no nausea or emesis  -had a recent E.coli UTI that was treated with Keflex for 7 days starting on 4-27. However, it appears that the patient is not taking this antibiotic at this time. We will order another urine culture and treat pending the culture results.    -It is likely that the patient will be able to pass this stone on her own and we will continue to strain her urine   -will continue to monitor her pain, wbc, and will also order a urine culture   Pt is stable at this time.      Watson Kathleen, APRN - CNP   [facility]  Urology

## 2019-05-03 NOTE — PROGRESS NOTES
Hand dominance: R  UE ROM: RUE: deferred due to NWB and sling  LUE: WFL  Strength: RUE: deferred  LUE: grossly 3+/5   Strength: B WFL  Fine Motor Coordination: WFL    Hearing: WFL  Sensation:  No c/o numbness or tingling  Tone:  WFL  Edema: none noted                            Comments/Treatment: Cleared by RN to see pt. Upon arrival, patient sitting EOB and agreeable to OT session. Completed functional mobility to/from bathroom Min A for steadying. Completed toileting/grooming tasks in bathroom. Educated pt on log rolling technique for ease with bed mobility. Educated on WB restrictions on R shoulder. Educated pt on 1 handed dressing technique for ease with LE dressing. Educated pt on dressing RUE first with UE dressing. At end of session, patient sitting in bed with call light and phone within reach, all lines and tubes intact. Pt would benefit from continued OT to increase functional independence and quality of life. Eval Complexity: Low    Assessment of current deficits   Functional mobility [x]  ADLs [x] Strength [x]  Cognition [x]  Functional transfers  [x] IADLs [x] Safety Awareness [x]  Endurance [x]  Fine Motor Coordination [x] Balance [x] Vision/perception [] Sensation []   Gross Motor Coordination [x] ROM [x] Delirium []                  Motor Control []    Plan of Care:   ADL retraining [x]   Equipment needs [x]   Neuromuscular re-education [x] Energy Conservation Techniques [x]  Functional Transfer training [x] Patient and/or Family Education [x]  Functional Mobility training [x]  Environmental Modifications [x]  Cognitive re-training []   Compensatory techniques for ADLs [x]  Splinting Needs []   Positioning to improve overall function [x]   Therapeutic Activity [x]  Therapeutic Exercise  [x]  Visual/Perceptual: []    Delirium prevention/treatment  []   Other:  []    Rehab Potential: Good for established goals, pt. assisted in establishment of goals.     Patient / Family Goal: not stated Patient were instructed on diagnosis, prognosis/goals and plan of care. Demonstrated fair understanding. [] Malnutrition indicators have been identified and nursing has been notified to ensure a dietitian consult is ordered. Evaluation time includes thorough review of current medical information, gathering information on past medical & social history & PLOF, completion of standardized testing, informal observation of tasks, consultation with other medical professions/disciplines, assessment of data & development of POC/goals. Tx. Time in: 10:35  Tx.  Time out: 10:50  low Evaluation + 15 timed treatment minutes    Fatmata De Los Santos [place], OTR/L [id number]

## 2019-05-03 NOTE — PROGRESS NOTES
ambulation however balance improved with continued mobility. Pt tolerated activity well. Recommend continued skilled PT services at discharge. At end of session pt supine with call light within reach and all needs met. Patient education  Pt educated re: safety recommendations, PT treatment expectations and POC, PT d/c recommendations. Patient response to education:   Pt verbalized understanding Pt demonstrated skill Pt requires further education in this area   Yes  partial Yes      Rehab potential is good for reaching above PT goals. Pts/ family goals   To get better       PLAN  PT care will be provided in accordance with the objectives noted above. Whenever appropriate, clear delegation orders will be provided for nursing staff. Exercises and functional mobility practice will be used as well as appropriate assistive devices or modalities to obtain goals. Patient and family education will also be administered as needed. Frequency of treatments will be daily x 1-3 days. Patient and or family understand(s) diagnosis, prognosis, and plan of care.       Time in: 1025  Time out: [address] [place], DPT   License number:  [id number]

## 2019-05-03 NOTE — PROGRESS NOTES
pectoris    Vitamin D deficiency    Hypothyroidism    CKD (chronic kidney disease), stage III (HCC)    VHD (valvular heart disease)    Hx of CABG    Fall    Kidney stone       Plan:  Notify uology of LLQ cb Cuevas  9:54 AM  5/3/2019

## 2019-05-04 LAB
INR BLD: 2.2
PROTHROMBIN TIME: 25.1 SEC (ref 9.3–12.4)

## 2019-05-04 PROCEDURE — 6370000000 HC RX 637 (ALT 250 FOR IP): Performed by: FAMILY MEDICINE

## 2019-05-04 PROCEDURE — 2580000003 HC RX 258: Performed by: UROLOGY

## 2019-05-04 PROCEDURE — 99231 SBSQ HOSP IP/OBS SF/LOW 25: CPT | Performed by: FAMILY MEDICINE

## 2019-05-04 PROCEDURE — 97530 THERAPEUTIC ACTIVITIES: CPT

## 2019-05-04 PROCEDURE — 36415 COLL VENOUS BLD VENIPUNCTURE: CPT

## 2019-05-04 PROCEDURE — 85610 PROTHROMBIN TIME: CPT

## 2019-05-04 PROCEDURE — 1200000000 HC SEMI PRIVATE

## 2019-05-04 RX ADMIN — LEVOTHYROXINE SODIUM 50 MCG: 50 TABLET ORAL at 06:28

## 2019-05-04 RX ADMIN — METOPROLOL SUCCINATE 50 MG: 50 TABLET, FILM COATED, EXTENDED RELEASE ORAL at 20:03

## 2019-05-04 RX ADMIN — SODIUM CHLORIDE, POTASSIUM CHLORIDE, SODIUM LACTATE AND CALCIUM CHLORIDE: 600; 310; 30; 20 INJECTION, SOLUTION INTRAVENOUS at 01:17

## 2019-05-04 RX ADMIN — SODIUM CHLORIDE, POTASSIUM CHLORIDE, SODIUM LACTATE AND CALCIUM CHLORIDE: 600; 310; 30; 20 INJECTION, SOLUTION INTRAVENOUS at 09:57

## 2019-05-04 RX ADMIN — TAMSULOSIN HYDROCHLORIDE 0.4 MG: 0.4 CAPSULE ORAL at 08:43

## 2019-05-04 RX ADMIN — WARFARIN SODIUM 4 MG: 4 TABLET ORAL at 17:26

## 2019-05-04 RX ADMIN — VITAMIN D, TAB 1000IU (100/BT) 3000 UNITS: 25 TAB at 08:43

## 2019-05-04 RX ADMIN — ACETAMINOPHEN 325 MG: 325 TABLET, FILM COATED ORAL at 00:14

## 2019-05-04 RX ADMIN — FUROSEMIDE 10 MG: 20 TABLET ORAL at 08:43

## 2019-05-04 ASSESSMENT — PAIN SCALES - GENERAL
PAINLEVEL_OUTOF10: 0
PAINLEVEL_OUTOF10: 0

## 2019-05-04 NOTE — PROGRESS NOTES
[facility] PROGRESS NOTE                                                                       2019        CHIEF UROLOGIC COMPLAINT: Left ureteral stone    HISTORY OF PRESENT ILLNESS:  Patient without new complaints. Had one episode of left flank pain overnight. Pain is resolved. No fevers overnight. Patient is very reluctant to have any surgical procedure. REVIEW OF SYSTEMS:   CONSTITUTIONAL: negative  HEENT: negative  HEMATOLOGIC: negative  ENDOCRINE: negative  RESPIRATORY: negative  CV: negative  GI: negative  NEURO: negative  ORTHOPEDICS: negative  PSYCHIATRIC: negative  : as above    PAST FAMILY HISTORY:    Family History   Problem Relation Age of Onset    Stroke Mother      PAST SOCIAL HISTORY:    Social History     Socioeconomic History    Marital status:       Spouse name: None    Number of children: None    Years of education: None    Highest education level: None   Occupational History    None   Social Needs    Financial resource strain: None    Food insecurity:     Worry: None     Inability: None    Transportation needs:     Medical: None     Non-medical: None   Tobacco Use    Smoking status: Former Smoker     Packs/day: 0.50     Years: 10.00     Pack years: 5.00     Types: Cigarettes     Last attempt to quit: 1971     Years since quittin.9    Smokeless tobacco: Never Used   Substance and Sexual Activity    Alcohol use: No     Comment:      Drug use: No    Sexual activity: None   Lifestyle    Physical activity:     Days per week: None     Minutes per session: None    Stress: None   Relationships    Social connections:     Talks on phone: None     Gets together: None     Attends Yazidism service: None     Active member of club or organization: None     Attends meetings of clubs or organizations: None     Relationship status: None    Intimate partner violence:     Fear of current or ex partner: None     Emotionally abused: None     Physically abused: None     Forced sexual activity: None   Other Topics Concern    None   Social History Narrative    Drinks 1 cup coffee/daDrinks 0-1 cup of coffee daily       Scheduled Meds:   vitamin D  3,000 Units Oral Daily    furosemide  10 mg Oral Daily    levothyroxine  50 mcg Oral Daily    metoprolol succinate  50 mg Oral Daily    tamsulosin  0.4 mg Oral Daily    warfarin  4 mg Oral Daily    sodium chloride flush  10 mL Intravenous 2 times per day     Continuous Infusions:   lactated ringers 125 mL/hr at 05/04/19 0117     PRN Meds:.sodium chloride flush, magnesium hydroxide, ondansetron, acetaminophen    BP (!) 146/67   Pulse 80   Temp 98.3 °F (36.8 °C) (Temporal)   Resp 16   Ht 5' 6\" (1.676 m)   Wt 134 lb 6.4 oz (61 kg)   SpO2 98%   BMI 21.69 kg/m²     Lab Results   Component Value Date    WBC 6.0 05/03/2019    HGB 11.9 05/03/2019    HCT 36.1 05/03/2019    MCV 96.0 05/03/2019     05/03/2019       Lab Results   Component Value Date    CREATININE 1.0 05/03/2019       No results found for: PSA        PHYSICAL EXAMINATION:  Skin dry, without rashes  Respirations non-labored, intact  Abdomen soft, non-tender, non-distended  Alert and oriented x3  Urine is toan without any debris    ASSESSMENT AND PLAN:  3 mm left ureteral stone   - E.coli UTI in the past. Repeat cultures pending.  -It is likely that the patient will be able to pass this stone on her own and we will continue to strain her urine   -Continue aggressive hydration  -Continue to strain urine to monitor for any stone fragments.  -Patient is very reluctant to have procedure but I discussed if she starts to develop fevers or intractable pain that we may need to progress to the operating room    -Left adrenal mass (needs to be monitored)    We will continue to follow          Electronically signed by Qi eLe MD on 5/4/2019 at 6:37 AM

## 2019-05-04 NOTE — PROGRESS NOTES
Admit Date: 5/1/2019       Subjective:    Pleasant sitting in chair eating lunch no complaints  Alert and oriented. Objective:    Scheduled Meds:  Current Facility-Administered Medications   Medication Dose Route Frequency Provider Last Rate Last Dose    vitamin D (CHOLECALCIFEROL) tablet 3,000 Units  3,000 Units Oral Daily Jody Swanson MD   3,000 Units at 05/04/19 0843    furosemide (LASIX) tablet 10 mg  10 mg Oral Daily Jody Swanson MD   10 mg at 05/04/19 5850    levothyroxine (SYNTHROID) tablet 50 mcg  50 mcg Oral Daily Jody Swanson MD   50 mcg at 05/04/19 8569    metoprolol succinate (TOPROL XL) extended release tablet 50 mg  50 mg Oral Daily Jody Swanson MD   50 mg at 05/03/19 2020    tamsulosin (FLOMAX) capsule 0.4 mg  0.4 mg Oral Daily Jody Swanson MD   0.4 mg at 05/04/19 8190    warfarin (COUMADIN) tablet 4 mg  4 mg Oral Daily Jody Swanson MD   4 mg at 05/03/19 1756    sodium chloride flush 0.9 % injection 10 mL  10 mL Intravenous 2 times per day Jody Swanson MD   10 mL at 05/02/19 0855    sodium chloride flush 0.9 % injection 10 mL  10 mL Intravenous PRN Jody Swanson MD        magnesium hydroxide (MILK OF MAGNESIA) 400 MG/5ML suspension 30 mL  30 mL Oral Daily PRN Jody Swanson MD        ondansetron [facility]) injection 4 mg  4 mg Intravenous Q6H PRN Jody Swanson MD        acetaminophen (TYLENOL) tablet 650 mg  650 mg Oral Q4H PRN Jody Swanson MD   325 mg at 05/04/19 0014    lactated ringers infusion   Intravenous Continuous Kalani Elizondo  mL/hr at 05/04/19 0957         Continuous Infusions  :   lactated ringers 125 mL/hr at 05/04/19 0957       PRN Meds:  sodium chloride flush, magnesium hydroxide, ondansetron, acetaminophen      Wt Readings from Last 3 Encounters:   05/02/19 134 lb 6.4 oz (61 kg)   05/01/19 127 lb (57.6 kg)   04/27/19 126 lb (57.2 kg)     I/O last 3 completed shifts:   In: 360 [P.O.:360]  Out: 1975 [QYRFZ:1180]    Intake/Output Summary (Last 24 hours) at 5/4/2019 1301  Last data filed at 5/4/2019 1138  Gross per 24 hour   Intake 300 ml   Output 1750 ml   Net -1450 ml       Vitals:    05/04/19 0815   BP: (!) 108/56   Pulse: 86   Resp: 18   Temp: 99.1 °F (37.3 °C)   SpO2: 97%       Physical Exam:                   General appearance - alert, well appearing, and in no distress and oriented to person, place, and time  Mental status - alert, oriented to person, place, and time, normal mood, behavior, speech, dress, motor activity, and thought processes  Eyes - pupils equal and reactive, extraocular eye movements intact  Ears - not examined  Nose - normal and patent, no erythema, discharge or polyps  Mouth - mucous membranes moist, pharynx normal without lesions  Neck - supple, no significant adenopathy  Chest - clear to auscultation, no wheezes, rales or rhonchi, symmetric air entry  Heart -irregularly irregular rhythm with rate   Abdomen - soft, nontender, nondistended, no masses or organomegaly  Neurological - alert, oriented, normal speech, no focal findings or movement disorder noted}  Extremities - peripheral pulses normal, no pedal edema, no clubbing or cyanosis R arm in sling. Fingers mobile, warm and dry  Skin - normal coloration and turgor, no rashes, no suspicious skin lesions noted                           CBC  Recent Labs     05/01/19  1645 05/01/19 2129 05/03/19  0511   WBC 7.2 7.8 6.0   HGB 15.3 13.6 11.9   HCT 46.5 41.2 36.1   MCV 95.5 95.4 96.0    153 130     BMP  Recent Labs     05/01/19  1743 05/01/19 2129 05/03/19  0511    142 141   K 4.1 3.9 3.4*    107 106   CO2 22 24 25   BUN 21 20 13   CREATININE 1.3* 1.1* 1.0   LABGLOM 38 47 52   CALCIUM 10.1 10.1 9.3     LFT's  Recent Labs     05/01/19 2129 05/03/19  0511   ALT 6 <5     INR:   Recent Labs     05/01/19 2129 05/03/19  0511 05/04/19  0505   INR 2.5 1.9 2.2         No results found for: CRP  No results found for: SEDRATE  No results for input(s): POCGLU in the last 72 hours.   Lipids: No results for input(s): CHOL, HDL in the last 72 hours.     Invalid input(s): LDLCALCU  ABGs: No results found for: PHART, PO2ART, RXU3FGY  SpO2 Readings from Last 1 Encounters:   05/04/19 97%       Last 3 Troponin:    Lab Results   Component Value Date    TROPONINI <0.01 05/01/2019    TROPONINI <0.01 08/28/2017    TROPONINI <0.01 05/05/2014     Lab Results   Component Value Date    CKTOTAL 58 03/22/2018    CKTOTAL 45 05/05/2014    CKTOTAL 30 (L) 07/11/2012    CKMB 2.3 05/05/2014    CKMB 1.1 07/11/2012    CKMB 28.9 (H) 06/06/2012    TROPONINI <0.01 05/01/2019    TROPONINI <0.01 08/28/2017    TROPONINI <0.01 05/05/2014        TSH:    Lab Results   Component Value Date    TSH 3.620 08/28/2017             U/A:     Lab Results   Component Value Date    NITRITE neg 09/19/2017    COLORU Yellow 05/01/2019    PHUR 6.5 05/01/2019    WBCUA 2-5 05/01/2019    WBCUA 1-3 06/05/2012    RBCUA >20 05/01/2019    RBCUA 2-5 06/11/2012    YEAST FEW 08/28/2017    BACTERIA FEW 05/01/2019    CLARITYU SL CLOUDY 05/01/2019    SPECGRAV 1.010 05/01/2019    LEUKOCYTESUR Negative 05/01/2019    UROBILINOGEN 0.2 05/01/2019    BILIRUBINUR Negative 05/01/2019    BILIRUBINUR neg 09/19/2017    BILIRUBINUR NEGATIVE 06/05/2012    BILIRUBINUR NEGATIVE 06/05/2012    BLOODU LARGE 05/01/2019    GLUCOSEU Negative 05/01/2019    GLUCOSEU NEGATIVE 06/05/2012    GLUCOSEU NEGATIVE 06/05/2012    AMORPHOUS MODERATE 09/19/2017          Iron studies:No results found for: FERRITIN  Bone disease:  Lab Results   Component Value Date     (H) 04/11/2019    MG 1.8 05/03/2019    PHOS 2.9 08/30/2018     Nutrition:No results found for: ALB           Assessment:  Patient Active Problem List   Diagnosis Code    Stress F43.9    [facility] I21.3    HTN (hypertension) I10    Intermittent atrial fibrillation (HCC) I48.0    Candidal skin infection B37.2    Subtherapeutic international normalized ratio (INR) R79.1    Afib ([address] I48.91    Presence of permanent cardiac pacemaker Z95.0    Coronary artery disease involving native coronary artery without angina pectoris I25.10    Vitamin D deficiency E55.9    Hypothyroidism E03.9    CKD (chronic kidney disease), stage III (HCC) N18.3    VHD (valvular heart disease) I38    Hx of CABG Z95.1    Fall W19. Marisol James    Kidney stone N20.0       1. R shoulder fx    2.  htn    3.   Kidney stone    4.  hypothyroid             Plan:    Discharge when precert done    Fito Darnell M.D.    5/4/2019

## 2019-05-04 NOTE — PROGRESS NOTES
Physical Therapy  Facility/Department: [facility]  Daily Treatment Note  NAME: Josep Daniel  : 6/15/1928  MRN: [id number]    Date of Service: 2019  Evaluating Therapist: Ora Garcia PT, DPT     Room #: 4063N  DIAGNOSIS: fall  PRECAUTIONS: NWB R UE with sling--proximal humerus, chair alarm      Pertinent Medical History: afib, aflutter, HTN, MI, shingles, hyperthyroidism, CAD, CKD, pacemaker      Social:    Pt lives alone. Pt lives in a 2 story home with 1st floor setup, 3 steps and 2 hand rail(s) to enter, full flight with HR to laundry in basement. Prior to admission pt used no AD and required no assistance with self care or mobiltiy.                Initial Evaluation  Date: 5/3/19 Treatment  Date:   Short Term/ Long Term   Goals   Was pt agreeable to Eval/treatment? Yes  yes      Did pt report pain? Pain in R UE, moderate  No c/ o pain      Bed Mobility  Rolling: NT  Supine to sit: min A   Sit to supine: min A   Scooting: min A   NT seated on B/S commode  Mod I   Transfers Sit to stand: min A   Stand to sit: min A  Stand pivot: min A Sit to stand: min A   Stand to sit: min A  Stand pivot: min A Mod I   Ambulation   250 feet with no AS with min A 200 ft x 1 no AD min A  >250 feet with AAD and mod I   Stair negotiation: ascended and descended 4 steps with 1 rail with min A   NT see comments  >4 steps with 1 rail with mod I   AMPAC Raw Score           Balance: fair     Pt performed therapeutic exercise of the following: sit <> stand x 5 reps emphasis on safety     Patient education  Pt was educated on safety w/ functional mobility     Patient response to education:   Pt verbalized understanding Pt demonstrated skill Pt requires further education in this area   x X w/ cues  X      Additional Comments: pt seated on B/S commode, aide present and reported \" pt is trying to pass a kidney stone , and urinates frequently . \" A pt to stand from B/S commode and don brief, pt had small

## 2019-05-05 VITALS
BODY MASS INDEX: 21.6 KG/M2 | RESPIRATION RATE: 18 BRPM | OXYGEN SATURATION: 98 % | SYSTOLIC BLOOD PRESSURE: 121 MMHG | WEIGHT: 134.4 LBS | HEIGHT: 66 IN | DIASTOLIC BLOOD PRESSURE: 56 MMHG | HEART RATE: 75 BPM | TEMPERATURE: 97.8 F

## 2019-05-05 LAB
INR BLD: 2.5
PROTHROMBIN TIME: 28.2 SEC (ref 9.3–12.4)
URINE CULTURE, ROUTINE: NORMAL

## 2019-05-05 PROCEDURE — 85610 PROTHROMBIN TIME: CPT

## 2019-05-05 PROCEDURE — 99231 SBSQ HOSP IP/OBS SF/LOW 25: CPT | Performed by: FAMILY MEDICINE

## 2019-05-05 PROCEDURE — 2580000003 HC RX 258: Performed by: UROLOGY

## 2019-05-05 PROCEDURE — 6370000000 HC RX 637 (ALT 250 FOR IP): Performed by: FAMILY MEDICINE

## 2019-05-05 PROCEDURE — 36415 COLL VENOUS BLD VENIPUNCTURE: CPT

## 2019-05-05 RX ORDER — CEPHALEXIN 500 MG/1
500 CAPSULE ORAL 3 TIMES DAILY
Qty: 21 CAPSULE | Refills: 0 | Status: CANCELLED | OUTPATIENT
Start: 2019-05-05 | End: 2019-05-12

## 2019-05-05 RX ADMIN — FUROSEMIDE 10 MG: 20 TABLET ORAL at 09:17

## 2019-05-05 RX ADMIN — VITAMIN D, TAB 1000IU (100/BT) 3000 UNITS: 25 TAB at 09:17

## 2019-05-05 RX ADMIN — LEVOTHYROXINE SODIUM 50 MCG: 50 TABLET ORAL at 05:47

## 2019-05-05 RX ADMIN — SODIUM CHLORIDE, POTASSIUM CHLORIDE, SODIUM LACTATE AND CALCIUM CHLORIDE: 600; 310; 30; 20 INJECTION, SOLUTION INTRAVENOUS at 09:20

## 2019-05-05 RX ADMIN — TAMSULOSIN HYDROCHLORIDE 0.4 MG: 0.4 CAPSULE ORAL at 09:17

## 2019-05-05 ASSESSMENT — PAIN SCALES - GENERAL
PAINLEVEL_OUTOF10: 0

## 2019-05-05 NOTE — CARE COORDINATION
Social Work Discharge Planning:  Patient is tentatively set up to leave at 3 PM via [employer] wheelchair going to [facility]. Bedside nurse will need to notify the family of patient's discharge.    Electronically signed by LUKE Tanner on 5/5/2019 at 1:45 PM

## 2019-05-05 NOTE — PLAN OF CARE
Problem: Falls - Risk of:  Goal: Will remain free from falls  Description  Will remain free from falls  Outcome: Met This Shift     Problem: Falls - Risk of:  Goal: Absence of physical injury  Description  Absence of physical injury  Outcome: Met This Shift     Problem: Pain:  Goal: Pain level will decrease  Description  Pain level will decrease  Outcome: Met This Shift     Problem: Pain:  Goal: Control of acute pain  Description  Control of acute pain  Outcome: Met This Shift

## 2019-05-05 NOTE — PROGRESS NOTES
Patient notified by charge nurse that she will be discharged. Patient requesting that family also be notified; family notified of discharge today at 3.

## 2019-05-05 NOTE — PROGRESS NOTES
Admit Date: 5/1/2019       Subjective:    Waiting for discharge to  Subacute rehab. Objective:    Scheduled Meds:  Current Facility-Administered Medications   Medication Dose Route Frequency Provider Last Rate Last Dose    vitamin D (CHOLECALCIFEROL) tablet 3,000 Units  3,000 Units Oral Daily Dion Spencer MD   3,000 Units at 05/04/19 0843    furosemide (LASIX) tablet 10 mg  10 mg Oral Daily Dion Spenecr MD   10 mg at 05/04/19 6668    levothyroxine (SYNTHROID) tablet 50 mcg  50 mcg Oral Daily Dion Spencer MD   50 mcg at 05/05/19 0547    metoprolol succinate (TOPROL XL) extended release tablet 50 mg  50 mg Oral Daily Dion Spencer MD   50 mg at 05/04/19 2003    tamsulosin [facility]) capsule 0.4 mg  0.4 mg Oral Daily Dion Spencer MD   0.4 mg at 05/04/19 1025    warfarin (COUMADIN) tablet 4 mg  4 mg Oral Daily Dion Spencer MD   4 mg at 05/04/19 1726    sodium chloride flush 0.9 % injection 10 mL  10 mL Intravenous 2 times per day Dion Spencer MD   10 mL at 05/02/19 0855    sodium chloride flush 0.9 % injection 10 mL  10 mL Intravenous PRN Dion Spencer MD        magnesium hydroxide (MILK OF MAGNESIA) 400 MG/5ML suspension 30 mL  30 mL Oral Daily PRN Dion Spencer MD        ondansetron [facility]) injection 4 mg  4 mg Intravenous Q6H PRN Dion Spencer MD        acetaminophen (TYLENOL) tablet 650 mg  650 mg Oral Q4H PRN Dion Spencer MD   325 mg at 05/04/19 0014    lactated ringers infusion   Intravenous Continuous Yan Elizondo  mL/hr at 05/04/19 0957         Continuous Infusions  :   lactated ringers 125 mL/hr at 05/04/19 0957       PRN Meds:  sodium chloride flush, magnesium hydroxide, ondansetron, acetaminophen      Wt Readings from Last 3 Encounters:   05/02/19 134 lb 6.4 oz (61 kg)   05/01/19 127 lb (57.6 kg)   04/27/19 126 lb (57.2 kg)     I/O last 3 completed shifts: In: 8086.1 [P.O.:300;  I.V.:7786.1]  Out: 1025 [Urine:1025]    Intake/Output Summary (Last 24 hours) at 5/5/2019 7496  Last data filed at 5/5/2019 0548  Gross per 24 hour   Intake 8026.09 ml   Output 825 ml   Net 7201.09 ml       Vitals:    05/05/19 0000   BP: 98/60   Pulse: 90   Resp: 18   Temp: 97.6 °F (36.4 °C)   SpO2: 97%       Physical Exam:                   General appearance - alert, well appearing, and in no distress and oriented to person, place, and time  Mental status - alert, oriented to person, place, and time, normal mood, behavior, speech, dress, motor activity, and thought processes  Eyes - pupils equal and reactive, extraocular eye movements intact  Ears - not examined  Nose - normal and patent, no erythema, discharge or polyps  Mouth - mucous membranes moist, pharynx normal without lesions  Neck - supple, no significant adenopathy  Chest - clear to auscultation, no wheezes, rales or rhonchi, symmetric air entry  Heart -irregularly irregular rhythm with rate   Abdomen - soft, nontender, nondistended, no masses or organomegaly  Neurological - alert, oriented, normal speech, no focal findings or movement disorder noted}  Extremities - peripheral pulses normal, no pedal edema, no clubbing or cyanosis R arm in sling. Fingers mobile, warm and dry  Skin - normal coloration and turgor, no rashes, no suspicious skin lesions noted                           CBC  Recent Labs     05/03/19  0511   WBC 6.0   HGB 11.9   HCT 36.1   MCV 96.0        BMP  Recent Labs     05/03/19  0511      K 3.4*      CO2 25   BUN 13   CREATININE 1.0   LABGLOM 52   CALCIUM 9.3     LFT's  Recent Labs     05/03/19  0511   ALT <5     INR:   Recent Labs     05/03/19  0511 05/04/19  0505 05/05/19  0508   INR 1.9 2.2 2.5         No results found for: CRP  No results found for: SEDRATE  No results for input(s): POCGLU in the last 72 hours. Lipids: No results for input(s): CHOL, HDL in the last 72 hours.     Invalid input(s): LDLCALCU  ABGs: No results found for: PHART, PO2ART, SJC6KSH  SpO2 Readings from Last 1 Encounters:   05/05/19 97%       Last 3 Troponin:    Lab Results   Component Value Date    TROPONINI <0.01 05/01/2019    TROPONINI <0.01 08/28/2017    TROPONINI <0.01 05/05/2014     Lab Results   Component Value Date    CKTOTAL 58 03/22/2018    CKTOTAL 45 05/05/2014    CKTOTAL 30 (L) 07/11/2012    CKMB 2.3 05/05/2014    CKMB 1.1 07/11/2012    CKMB 28.9 (H) 06/06/2012    TROPONINI <0.01 05/01/2019    TROPONINI <0.01 08/28/2017    TROPONINI <0.01 05/05/2014        TSH:    Lab Results   Component Value Date    TSH 3.620 08/28/2017             U/A:     Lab Results   Component Value Date    NITRITE neg 09/19/2017    COLORU Yellow 05/01/2019    PHUR 6.5 05/01/2019    WBCUA 2-5 05/01/2019    WBCUA 1-3 06/05/2012    RBCUA >20 05/01/2019    RBCUA 2-5 06/11/2012    YEAST FEW 08/28/2017    BACTERIA FEW 05/01/2019    CLARITYU SL CLOUDY 05/01/2019    SPECGRAV 1.010 05/01/2019    LEUKOCYTESUR Negative 05/01/2019    UROBILINOGEN 0.2 05/01/2019    BILIRUBINUR Negative 05/01/2019    BILIRUBINUR neg 09/19/2017    BILIRUBINUR NEGATIVE 06/05/2012    BILIRUBINUR NEGATIVE 06/05/2012    BLOODU LARGE 05/01/2019    GLUCOSEU Negative 05/01/2019    GLUCOSEU NEGATIVE 06/05/2012    GLUCOSEU NEGATIVE 06/05/2012    AMORPHOUS MODERATE 09/19/2017          Iron studies:No results found for: FERRITIN  Bone disease:  Lab Results   Component Value Date     (H) 04/11/2019    MG 1.8 05/03/2019    PHOS 2.9 08/30/2018     Nutrition:No results found for: ALB           Assessment:  Patient Active Problem List   Diagnosis Code    Stress F43.9    STEMI (ST elevation myocardial infarction) ([facility] I21.3    HTN (hypertension) I10    Intermittent atrial fibrillation (HCC) I48.0    Candidal skin infection B37.2    Subtherapeutic international normalized ratio (INR) R79.1    Afib (HCC) I48.91    Presence of permanent cardiac pacemaker Z95.0    Coronary artery disease involving native coronary artery without angina pectoris I25.10    Vitamin D deficiency E55.9    Hypothyroidism E03.9    CKD (chronic kidney disease), stage III (HCC) N18.3    VHD (valvular heart disease) I38    Hx of CABG Z95.1    Fall W19. Doroteo Ernestine    Kidney stone N20.0       1. R shoulder fx    2.  htn    3.   Kidney stone    4.  hypothyroid             Plan:    Discharge when precert done    Sonia Laird M.D.    5/5/2019

## 2019-05-05 NOTE — PLAN OF CARE
Problem: Falls - Risk of:  Goal: Will remain free from falls  Description  Will remain free from falls  5/5/2019 1158 by Chava Alexandra RN  Outcome: Met This Shift     Problem: Falls - Risk of:  Goal: Absence of physical injury  Description  Absence of physical injury  5/5/2019 1158 by Chava Alexandra RN  Outcome: Met This Shift

## 2019-05-07 PROBLEM — S42.309A FRACTURE, HUMERUS: Status: ACTIVE | Noted: 2019-05-07

## 2019-05-07 PROBLEM — N13.2 HYDRONEPHROSIS WITH URINARY OBSTRUCTION DUE TO URETERAL CALCULUS: Status: ACTIVE | Noted: 2019-05-07

## 2019-05-15 ENCOUNTER — OFFICE VISIT (OUTPATIENT)
Dept: CARDIOLOGY CLINIC | Age: 84
End: 2019-05-15
Payer: MEDICARE

## 2019-05-15 ENCOUNTER — NURSE ONLY (OUTPATIENT)
Dept: CARDIOLOGY CLINIC | Age: 84
End: 2019-05-15
Payer: MEDICARE

## 2019-05-15 VITALS
SYSTOLIC BLOOD PRESSURE: 102 MMHG | HEIGHT: 67 IN | WEIGHT: 127 LBS | HEART RATE: 88 BPM | DIASTOLIC BLOOD PRESSURE: 64 MMHG | BODY MASS INDEX: 19.93 KG/M2

## 2019-05-15 DIAGNOSIS — I27.20 PULMONARY HTN (HCC): ICD-10-CM

## 2019-05-15 DIAGNOSIS — I34.0 NON-RHEUMATIC MITRAL REGURGITATION: ICD-10-CM

## 2019-05-15 DIAGNOSIS — I25.5 ISCHEMIC CARDIOMYOPATHY: ICD-10-CM

## 2019-05-15 DIAGNOSIS — Z95.1 STATUS POST CORONARY ARTERY BYPASS GRAFT: ICD-10-CM

## 2019-05-15 DIAGNOSIS — I49.5 SICK SINUS SYNDROME (HCC): ICD-10-CM

## 2019-05-15 DIAGNOSIS — I48.21 PERMANENT ATRIAL FIBRILLATION (HCC): ICD-10-CM

## 2019-05-15 DIAGNOSIS — I25.10 CORONARY ARTERY DISEASE INVOLVING NATIVE CORONARY ARTERY OF NATIVE HEART WITHOUT ANGINA PECTORIS: Primary | ICD-10-CM

## 2019-05-15 DIAGNOSIS — Z95.0 STATUS POST PLACEMENT OF CARDIAC PACEMAKER: Primary | ICD-10-CM

## 2019-05-15 DIAGNOSIS — Z95.1 HX OF CABG: ICD-10-CM

## 2019-05-15 DIAGNOSIS — Z95.0 STATUS POST PLACEMENT OF CARDIAC PACEMAKER: ICD-10-CM

## 2019-05-15 PROCEDURE — 93000 ELECTROCARDIOGRAM COMPLETE: CPT | Performed by: INTERNAL MEDICINE

## 2019-05-15 PROCEDURE — 4040F PNEUMOC VAC/ADMIN/RCVD: CPT | Performed by: INTERNAL MEDICINE

## 2019-05-15 PROCEDURE — G8598 ASA/ANTIPLAT THER USED: HCPCS | Performed by: INTERNAL MEDICINE

## 2019-05-15 PROCEDURE — 1036F TOBACCO NON-USER: CPT | Performed by: INTERNAL MEDICINE

## 2019-05-15 PROCEDURE — G8420 CALC BMI NORM PARAMETERS: HCPCS | Performed by: INTERNAL MEDICINE

## 2019-05-15 PROCEDURE — 1111F DSCHRG MED/CURRENT MED MERGE: CPT | Performed by: INTERNAL MEDICINE

## 2019-05-15 PROCEDURE — 99214 OFFICE O/P EST MOD 30 MIN: CPT | Performed by: INTERNAL MEDICINE

## 2019-05-15 PROCEDURE — 93279 PRGRMG DEV EVAL PM/LDLS PM: CPT | Performed by: INTERNAL MEDICINE

## 2019-05-15 PROCEDURE — 1090F PRES/ABSN URINE INCON ASSESS: CPT | Performed by: INTERNAL MEDICINE

## 2019-05-15 PROCEDURE — 1123F ACP DISCUSS/DSCN MKR DOCD: CPT | Performed by: INTERNAL MEDICINE

## 2019-05-15 PROCEDURE — G8427 DOCREV CUR MEDS BY ELIG CLIN: HCPCS | Performed by: INTERNAL MEDICINE

## 2019-05-15 RX ORDER — ACETAMINOPHEN 325 MG/1
650 TABLET ORAL EVERY 4 HOURS PRN
COMMUNITY
End: 2019-06-03

## 2019-05-15 RX ORDER — BISACODYL 10 MG
10 SUPPOSITORY, RECTAL RECTAL DAILY
COMMUNITY
End: 2019-06-03

## 2019-05-15 NOTE — PATIENT INSTRUCTIONS
Patient Education        A Healthy Heart: Care Instructions  Your Care Instructions    Heart disease occurs when a substance called plaque builds up in the vessels that supply oxygen-rich blood to your heart. This can narrow the blood vessels and reduce blood flow. A heart attack happens when blood flow is completely blocked. A high-fat diet, smoking, and other factors increase the risk of heart disease. Your doctor has found that you have a chance of having heart disease. You can do lots of things to keep your heart healthy. It may not be easy, but you can change your diet, exercise more, and quit smoking. These steps really work to lower your chance of heart disease. Follow-up care is a key part of your treatment and safety. Be sure to make and go to all appointments, and call your doctor if you are having problems. It's also a good idea to know your test results and keep a list of the medicines you take. How can you care for yourself at home? Diet    · Use less salt when you cook and eat. This helps lower your blood pressure. Taste food before salting. Add only a little salt when you think you need it. With time, your taste buds will adjust to less salt.     · Eat fewer snack items, fast foods, canned soups, and other high-salt, high-fat, processed foods.     · Read food labels and try to avoid saturated and trans fats. They increase your risk of heart disease by raising cholesterol levels.     · Limit the amount of solid fat-butter, margarine, and shortening-you eat. Use olive, peanut, or canola oil when you cook. Bake, broil, and steam foods instead of frying them.     · Eating fish can lower your risk for heart disease. Eat at least 2 servings of fish a week. [place], mackerel, herring, sardines, and chunk light tuna are very good choices. These fish contain omega-3 fatty acids.     · Eat a variety of fruit and vegetables every day.  Dark green, deep orange, red, or yellow fruits and vegetables are especially good for you. Examples include spinach, carrots, peaches, and berries.     · Foods high in fiber can reduce your cholesterol and provide important vitamins and minerals. High-fiber foods include whole-grain cereals and breads, oatmeal, beans, brown rice, citrus fruits, and apples.     · Limit drinks and foods with added sugar. These include candy, desserts, and soda pop.    Lifestyle changes    · If your doctor recommends it, get more exercise. Walking is a good choice. Bit by bit, increase the amount you walk every day. Try for at least 30 minutes on most days of the week. You also may want to swim, bike, or do other activities.     · Do not smoke. If you need help quitting, talk to your doctor about stop-smoking programs and medicines. These can increase your chances of quitting for good. Quitting smoking may be the most important step you can take to protect your heart. It is never too late to quit. You will get health benefits right away.     · Limit alcohol to 2 drinks a day for men and 1 drink a day for women. Too much alcohol can cause health problems. Medicines    · Take your medicines exactly as prescribed. Call your doctor if you think you are having a problem with your medicine.     · If your doctor recommends aspirin, take the amount directed each day. Make sure you take aspirin and not another kind of pain reliever, such as acetaminophen (Tylenol). If you take ibuprofen (such as Advil or Motrin) for other problems, take aspirin at least 2 hours before taking ibuprofen. When should you call for help? Call 911 if you have symptoms of a heart attack.  These may include:    · Chest pain or pressure, or a strange feeling in the chest.     · Sweating.     · Shortness of breath.     · Pain, pressure, or a strange feeling in the back, neck, jaw, or upper belly or in one or both shoulders or arms.     · Lightheadedness or sudden weakness.     · A fast or irregular heartbeat.    After you call 911, the  may tell you to chew 1 adult-strength or 2 to 4 low-dose aspirin. Wait for an ambulance. Do not try to drive yourself.   Watch closely for changes in your health, and be sure to contact your doctor if you have any problems. Where can you learn more? Go to [email or web address] and sign in to your [employer] account. Enter Q376 in the [facility] box to learn more about \"A Healthy Heart: Care Instructions. \"     If you do not have an account, please click on the \"Sign Up Now\" link. Current as of: July 22, 2018  Content Version: 12.0  © 6275-1891 Healthwise, Incorporated. Care instructions adapted under license by [facility] ([facility]). If you have questions about a medical condition or this instruction, always ask your healthcare professional. [address] any warranty or liability for your use of this information.

## 2019-05-15 NOTE — PROGRESS NOTES
OFFICE VISIT     PRIMARY CARE PHYSICIAN:      Keshia Avila MD       ALLERGIES / SENSITIVITIES:        Allergies   Allergen Reactions    Codeine           REVIEWED MEDICATIONS:        Current Outpatient Medications:     acetaminophen (TYLENOL) 325 MG tablet, Take 650 mg by mouth every 4 hours as needed for Pain, Disp: , Rfl:     bisacodyl (DULCOLAX) 10 MG suppository, Place 10 mg rectally daily, Disp: , Rfl:     Magnesium Hydroxide (MILK OF MAGNESIA PO), Take 30 mLs by mouth, Disp: , Rfl:     warfarin (COUMADIN) 4 MG tablet, Take 4 mg by mouth daily, Disp: , Rfl:     furosemide (LASIX) 20 MG tablet, Take 10 mg by mouth daily , Disp: , Rfl: 0    levothyroxine (SYNTHROID) 50 MCG tablet, take 1 tablet by mouth once daily, Disp: 30 tablet, Rfl: 3    metoprolol succinate (TOPROL XL) 50 MG extended release tablet, TAKE 1 TABLET BY MOUTH EVERY EVENING, Disp: 30 tablet, Rfl: 6    Cholecalciferol (VITAMIN D) 2000 UNITS CAPS capsule, Take 3,000 Units by mouth daily , Disp: , Rfl:     tamsulosin (FLOMAX) 0.4 MG capsule, Take 1 capsule by mouth daily for 5 days, Disp: 5 capsule, Rfl: 0    naproxen (NAPROSYN) 500 MG tablet, Take 0.5 tablets by mouth 2 times daily, Disp: 30 tablet, Rfl: 0      S: REASON FOR VISIT:       Chief Complaint   Patient presents with    Atrial Fibrillation     6 mo ov w/ Pacer check. Elevated HR per Pm check Afib  at times. Pt fell and fractured right arm. Pt has no cardiac complaints today    Coronary Artery Disease          History of Present Illness:       Office Visit for follow up of VHD, A fib, CAD, pacemaker   Susan Ritter and broke right arm - wearing sling   No hospitalizations or surgeries since last visit     Mason any exertional chest pain or short of breath   No palpitations, dizzy or syncope.    Active at home   No orthopnea   Try to watch diet   Compliant with all medications       Past Medical History:   Diagnosis Date    [facility] [address] [facility]     Allergic rhinitis     Anticoagulant long-term use     Atrial flutter [facility]cardiogram ([facility] 02/10/2014    Admitted [facility] 02/10/14 and cardioverted on 2014. Resumed on Coumadin.     CAD (coronary artery disease)     CKD (chronic kidney disease), stage III ([facility] [address] 2017    HTN (hypertension)     Hyperlipidemia     Hypothyroidism 3/21/2017    [facility]     Shingles     Urinary incontinence             Past Surgical History:   Procedure Laterality Date    A-V CARDIAC PACEMAKER INSERTION  2014    Dr Sara Castellanos DYSRHYTHMIC FOCUS  12    modified maze procedure with LA excision     CARDIAC CATHETERIZATION  2012    Dr. Abi Alvarado  10/21/2014    Dr Samayoa Cam:  200joules = SR    COLONOSCOPY      CORONARY ARTERY BYPASS GRAFT  12    4 vessel / Dr. Abad Mcneal ECHO COMPL [address]  2012         EYE SURGERY      PACEMAKER PLACEMENT      UPPER GASTROINTESTINAL ENDOSCOPY            Family History   Problem Relation Age of Onset    Stroke Mother           Social History     Tobacco Use    Smoking status: Former Smoker     Packs/day: 0.50     Years: 10.00     Pack years: 5.00     Types: Cigarettes     Last attempt to quit: 1971     Years since quittin.9    Smokeless tobacco: Never Used   Substance Use Topics    Alcohol use: No     Comment:           Review of Systems:  HEENT: negative for acute visual symptoms or auditory problems, no dysphagia  Constitutional: negative for fever and chills, or significant weight loss  Respiratory: negative for cough, wheezing, or hemoptysis  Cardiovascular: negative for chest pain, palpitations, and dyspnea  Gastrointestinal: negative for abdominal pain, diarrhea, nausea and vomiting  Endocrine: Negative for polyuria and polydyspsia  Genitourinary:negative for dysuria and hematuria  Derm: negative for rash and skin lesion(s)  Neurological: negative for tingling, numbness, weakness, seizures and tremors  Endocrine: negative for polydipsia and polyuria  Musculoskeletal: negative for pain or tenderness  Psychiatric: negative for anxiety, depression, or suicidal ideations         O:  COMPLETE PHYSICAL EXAM:       /64  Pulse 88   Ht 5' 7\" (1.702 m)   Wt 127 lb (57.6 kg)   BMI 19.89 kg/m²       General:   Patient alert, comfortable, no distress. Appears stated age. HEENT:    Pupils equal, no icterus, no nasal drainage, tongue moist.   Neck:              No masses, Thyroid not palpable. Chest:   Normal configuration, non tender. Lungs:   Clear to auscultation bilaterally, few scattered rhonchi. Cardiovascular:  Irregular rhythm, 2-9/9 systolic murmur, No S3, , no palpable thrills, +ve elevated JVD, No carotid bruit. Abdomen:  Soft, Non tender, Bowel sounds normal,   Extremities:  No edema. Distal pulses palpable. No cyanosis, no clubbing. Right arm in sling   Skin:   Good turgor, warm and dry, no cyanosis. Musculoskeletal: No joint swelling or deformity. Neuro:   Cranial nerves grossly intact; No focal neurologic deficit. Psych:   Alert, good mood and effect. REVIEW OF DIAGNOSTIC TESTS:        Electrocardiogram: A Fib, Inferolateral ST/T chagnes     Pacemaker:  Interrogated, OK    :           A/P:   ASSESSMENT / PLAN:    Christine Pearson was seen today for atrial fibrillation and coronary artery disease.     Diagnoses and all orders for this visit:    Coronary artery disease involving native coronary artery of native heart without angina pectoris; ;  No Aspirin due to Coumadin, Off Statins per her request, On beta blockers  -     EKG 12 Lead     Hx of CABG x4 2012-Dr Lloyd: Stable    Low BP - asymptomatic -Decrease Metoprolol to 25mg If symptomatic    Moderate to severe mitral regurgitation; Declined valve clinic referral for valve surgery-[facility] only     Permanent Atrial fibrillation: (HCC)l s/p Maze 2012; s/p DCCV 2014 - On Coumadin; Dr Lynette Harmon follows INRs  -     EKG 12 Lead     Ischemic cardiomyopathy: EF 25-30% by Echo 6/11/18-She declined Life Vest/ICD upgrade; Continue BB, Diuretics; No ACE/ARB/Entrsto due to low BP and CKD    Status post placement of cardiac pacemaker-11/2014-Normal Fn     VHD (valvular heart disease): Stable     Pulmonary hypertension, moderate to severe     Severe tricuspid valve regurgitation      Essential hypertension: Stable     Preventive Cardiology: Low cholesterol diet, regular exercise as tolerate, and gradual weight loss discussed. Other orders  -     EKG 12 Lead     Preventive Cardiology: Low cholesterol diet, regular exercise as tolerate, and gradual weight loss discussed. Monitor BP and heart rates. All questions answered about cardiac diagnoses and cardiac medications. Continue current medications. Compliance with medications and f/u with all physicians discussed. Risk factor modification based on risk profile discussed. Call if any exertional chest pain, short of breath, dizzy or palpitations   Follow up in 6 months or earlier if needed.          [facility]  [address] [facility] [place], [address] [place] [address]  [phone]

## 2019-05-17 ENCOUNTER — TELEPHONE (OUTPATIENT)
Dept: ORTHOPEDIC SURGERY | Age: 84
End: 2019-05-17

## 2019-05-17 DIAGNOSIS — S42.201A CLOSED FRACTURE OF PROXIMAL END OF RIGHT HUMERUS, UNSPECIFIED FRACTURE MORPHOLOGY, INITIAL ENCOUNTER: Primary | ICD-10-CM

## 2019-05-20 ENCOUNTER — OFFICE VISIT (OUTPATIENT)
Dept: ORTHOPEDIC SURGERY | Age: 84
End: 2019-05-20
Payer: MEDICARE

## 2019-05-20 VITALS
SYSTOLIC BLOOD PRESSURE: 92 MMHG | WEIGHT: 130 LBS | HEIGHT: 67 IN | BODY MASS INDEX: 20.4 KG/M2 | RESPIRATION RATE: 22 BRPM | HEART RATE: 90 BPM | DIASTOLIC BLOOD PRESSURE: 52 MMHG

## 2019-05-20 DIAGNOSIS — S42.201A CLOSED FRACTURE OF PROXIMAL END OF RIGHT HUMERUS, UNSPECIFIED FRACTURE MORPHOLOGY, INITIAL ENCOUNTER: Primary | ICD-10-CM

## 2019-05-20 PROCEDURE — 1036F TOBACCO NON-USER: CPT | Performed by: ORTHOPAEDIC SURGERY

## 2019-05-20 PROCEDURE — 4040F PNEUMOC VAC/ADMIN/RCVD: CPT | Performed by: ORTHOPAEDIC SURGERY

## 2019-05-20 PROCEDURE — G8420 CALC BMI NORM PARAMETERS: HCPCS | Performed by: ORTHOPAEDIC SURGERY

## 2019-05-20 PROCEDURE — 99213 OFFICE O/P EST LOW 20 MIN: CPT | Performed by: ORTHOPAEDIC SURGERY

## 2019-05-20 PROCEDURE — 23600 CLTX PROX HUMRL FX W/O MNPJ: CPT | Performed by: ORTHOPAEDIC SURGERY

## 2019-05-20 PROCEDURE — 1090F PRES/ABSN URINE INCON ASSESS: CPT | Performed by: ORTHOPAEDIC SURGERY

## 2019-05-20 PROCEDURE — 1123F ACP DISCUSS/DSCN MKR DOCD: CPT | Performed by: ORTHOPAEDIC SURGERY

## 2019-05-20 PROCEDURE — G8427 DOCREV CUR MEDS BY ELIG CLIN: HCPCS | Performed by: ORTHOPAEDIC SURGERY

## 2019-05-20 PROCEDURE — G8598 ASA/ANTIPLAT THER USED: HCPCS | Performed by: ORTHOPAEDIC SURGERY

## 2019-05-20 PROCEDURE — 1111F DSCHRG MED/CURRENT MED MERGE: CPT | Performed by: ORTHOPAEDIC SURGERY

## 2019-05-20 NOTE — PROGRESS NOTES
Department of Orthopedic Surgery  Office note              Reason for Consult:  Right arm pain     HISTORY OF PRESENT ILLNESS:                   Patient is a 80 y.o. female who presents with right shoulder pain after a fall onto her arm yesterday. She states she is right hand dominant. She states she lives alone and has not seen any orthopedic surgeons in the past. She states her arm is feeling better since her fall yesterday. Denies numbness/tingling/paresthesias. Denies any other orthopedic complaints at this time. Patient seen back today two weeks out from fracture. She reports arm is feeling better. She does have at home occupational therapy which she relates has helped her with her ADLs. She does not have any therapy.       Past Medical History:    Past Medical History             Diagnosis Date    Afib [facility])      Allergic rhinitis      Anticoagulant long-term use      Atrial flutter by electrocardiogram ([facility] [address] 02/10/2014     Admitted [facility] 02/10/14 and cardioverted on 02/13/2014. Resumed on Coumadin.     CAD (coronary artery disease)      CKD (chronic kidney disease), stage III ([address] 9/19/2017    HTN (hypertension)      Hyperlipidemia      Hypothyroidism 3/21/2017    MI (myocardial infarction) ([facility] [address] [facility] 6/12    Shingles      Urinary incontinence           Past Surgical History:    Past Surgical History             Procedure Laterality Date    A-V CARDIAC PACEMAKER INSERTION   11/4/2014     Dr Sandhya Armstrong DYSRHYTHMIC FOCUS   6/6/12     modified maze procedure with LA excision     CARDIAC CATHETERIZATION   06/04/2012     Dr. Vladimir Landry   10/21/2014     Dr Karine Smith:  200joules = SR    COLONOSCOPY        CORONARY ARTERY BYPASS GRAFT   6/6/12     4 vessel / Dr. Agustina Chatterjee   6/5/2012          EYE SURGERY        PACEMAKER PLACEMENT        UPPER GASTROINTESTINAL ENDOSCOPY             Current Medications:     Current Hospital Medications   Current Facility-Administered Medications: vitamin D (CHOLECALCIFEROL) tablet 3,000 Units, 3,000 Units, Oral, Daily  furosemide (LASIX) tablet 10 mg, 10 mg, Oral, Daily  levothyroxine (SYNTHROID) tablet 50 mcg, 50 mcg, Oral, Daily  metoprolol succinate (TOPROL XL) extended release tablet 50 mg, 50 mg, Oral, Daily  tamsulosin (FLOMAX) capsule 0.4 mg, 0.4 mg, Oral, Daily  warfarin (COUMADIN) tablet 4 mg, 4 mg, Oral, Daily  sodium chloride flush 0.9 % injection 10 mL, 10 mL, Intravenous, 2 times per day  sodium chloride flush 0.9 % injection 10 mL, 10 mL, Intravenous, PRN  magnesium hydroxide (MILK OF MAGNESIA) 400 MG/5ML suspension 30 mL, 30 mL, Oral, Daily PRN  ondansetron (ZOFRAN) injection 4 mg, 4 mg, Intravenous, Q6H PRN  acetaminophen (TYLENOL) tablet 650 mg, 650 mg, Oral, Q4H PRN     Allergies:  Codeine     Social History:   TOBACCO:   reports that she quit smoking about 47 years ago. Her smoking use included cigarettes. She has a 5.00 pack-year smoking history. She has never used smokeless tobacco.  ETOH:   reports that she does not drink alcohol. DRUGS:   reports that she does not use drugs.   ACTIVITIES OF DAILY LIVING:    OCCUPATION:    Family History:   Family History       Problem Relation Age of Onset    Stroke Mother              REVIEW OF SYSTEMS:  CONSTITUTIONAL:  negative for  fevers, chills  EYES:  negative for blurred vision, visual disturbance  HEENT:  negative for  hearing loss, voice change  RESPIRATORY:  negative for  dyspnea, wheezing  CARDIOVASCULAR:  negative for  chest pain, palpitations  GASTROINTESTINAL:  negative for nausea, vomiting  GENITOURINARY:  negative for frequency, urinary incontinence  HEMATOLOGIC/LYMPHATIC:  negative for bleeding and petechiae  MUSCULOSKELETAL:  positive for  Pain right shoulder  NEUROLOGICAL:  negative for headaches, dizziness  BEHAVIOR/PSYCH:  negative for increased agitation and anxiety     PHYSICAL EXAM:    VITALS:  /73   Pulse 62   Temp 98 °F (36.7 °C) (Temporal)   Resp 16   Ht 5' 6\" (1.676 m)   Wt 134 lb 6.4 oz (61 kg)   SpO2 97%   BMI 21.69 kg/m²   CONSTITUTIONAL:  awake, alert, cooperative, no apparent distress, and appears stated age  MUSCULOSKELETAL:  Right upper Extremity:  · Skin intact circumferentially. Resolving ecchymosis over the humerus and elbow forearm region. Minimal swelling. · Sensation intact to light touch in median, ulnar, radial, ulnar nerve distributions of the hand  · + AIN, PIN, ulnar motor function to the hand  · Patient able to flex and extend the fingers, wrist, elbow  · Limited shoulder range of motion  · Mild  TTP diffusely about the shoulder  · No tenderness to palpation about the fingers, wrist, forearm, elbow. · Moderate swelling to the right shoulder  · Compartment soft and compressible     DATA:    CBC:         Lab Results   Component Value Date     WBC 7.8 05/01/2019     RBC 4.32 05/01/2019     HGB 13.6 05/01/2019     HCT 41.2 05/01/2019     MCV 95.4 05/01/2019     MCH 31.5 05/01/2019     MCHC 33.0 05/01/2019     RDW 13.7 05/01/2019      05/01/2019     MPV 9.7 05/01/2019      PT/INR:          Lab Results   Component Value Date     PROTIME 28.5 05/01/2019     PROTIME 28.4 05/11/2018     INR 2.5 05/01/2019         Radiology Review:  X-rays of the right shoulder obtained today in the office AP and Y views were compared to images from May 1, 2019. There is a impacted proximal humerus fracture. Slight displacement from her date of injury films. Glenohumeral joint reduced on the scapular Y view. Impression office x-rays: Impacted right proximal humerus fracture with mild interval displacement.     IMPRESSION:  · Right proximal humerus fracture     PLAN:    Recommended continue sling immobilization. Follow-up in 2-3 weeks with repeat x-rays. If good healing present may institute formal physical therapy for range of motion.
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
## 2019-06-02 NOTE — DISCHARGE SUMMARY
Admit Date: 5/1/2019  8:12 PM   Discharge Date: 5/5/2019    Patient Active Problem List   Diagnosis    Stress    STEMI ([facility] elevation myocardial infarction) ([facility]    HTN (hypertension)    Intermittent atrial fibrillation (HCC)    Candidal skin infection    Subtherapeutic international normalized ratio (INR)    Afib (HCC)    Presence of permanent cardiac pacemaker    Coronary artery disease involving native coronary artery without angina pectoris    Vitamin D deficiency    Hypothyroidism    CKD (chronic kidney disease), stage III ([facility]    VHD (valvular heart disease)    Hx of CABG    Kidney stone    Fracture, humerus    Hydronephrosis with urinary obstruction due to ureteral calculus        Present on Admission:   (Resolved) Fall   Kidney stone        [address] Medication Instructions [facility]:[id number]    Printed on:06/02/19 [id number]   Medication Information                      Cholecalciferol (VITAMIN D) 2000 UNITS CAPS capsule  Take 3,000 Units by mouth daily              furosemide (LASIX) 20 MG tablet  Take 10 mg by mouth daily              levothyroxine (SYNTHROID) 50 MCG tablet  take 1 tablet by mouth once daily             metoprolol succinate (TOPROL XL) 50 MG extended release tablet  TAKE 1 TABLET BY MOUTH EVERY EVENING             naproxen (NAPROSYN) 500 MG tablet  Take 0.5 tablets by mouth 2 times daily             tamsulosin (FLOMAX) 0.4 MG capsule  Take 1 capsule by mouth daily for 5 days             warfarin (COUMADIN) 4 MG tablet  Take 4 mg by mouth daily                  Hospital Course/Procedures:80year-old was admitted on 5/1/19 after initially being diagnosed with a kidney stone and then later on that day falling and fracturing her RIGHT humerus. Orthopedics was consulted and  recommended nonsurgical management. Urology was consulted. It appeared the patient had already passed the stone.   Due to patient's advanced age and being RIGHT handed PT OT and social service work consulted for placement. Patient was discharged to skilled nursing facility in stable condition on 5/5/19. Consultants Following:Orthopedics and urology    [facility].    Follow-up:She will be followed by the skilled nursing facility physician and will also be followed as an outpatient by orthopedics and urology.       Abi Silva  6/2/2019  2:48 PM

## 2019-06-03 ENCOUNTER — HOSPITAL ENCOUNTER (EMERGENCY)
Age: 84
Discharge: HOME | End: 2019-06-03
Payer: MEDICARE

## 2019-06-03 ENCOUNTER — APPOINTMENT (OUTPATIENT)
Dept: GENERAL RADIOLOGY | Age: 84
End: 2019-06-03
Payer: MEDICARE

## 2019-06-03 VITALS
DIASTOLIC BLOOD PRESSURE: 72 MMHG | HEIGHT: 67 IN | TEMPERATURE: 97.3 F | OXYGEN SATURATION: 98 % | SYSTOLIC BLOOD PRESSURE: 122 MMHG | RESPIRATION RATE: 16 BRPM | WEIGHT: 130 LBS | BODY MASS INDEX: 20.4 KG/M2 | HEART RATE: 92 BPM

## 2019-06-03 DIAGNOSIS — K59.00 CONSTIPATION, UNSPECIFIED CONSTIPATION TYPE: Primary | ICD-10-CM

## 2019-06-03 PROCEDURE — 74022 RADEX COMPL AQT ABD SERIES: CPT

## 2019-06-03 PROCEDURE — 99283 EMERGENCY DEPT VISIT LOW MDM: CPT

## 2019-06-03 RX ORDER — POLYETHYLENE GLYCOL 3350 17 G/17G
17 POWDER, FOR SOLUTION ORAL DAILY
Qty: 510 G | Refills: 0 | Status: SHIPPED | OUTPATIENT
Start: 2019-06-03 | End: 2019-07-03

## 2019-06-03 NOTE — ED PROVIDER NOTES
Independent MLP    HPI:  6/3/19,   Time: 4:56 PM         Pratibha Dobbins is a 80 y.o. female presenting to the ED from home with family and complains of no bowel movement over the past three days. She denies any abd pain, nausea vomiting or diarrhea. The complaint has been persistent, mild in severity, and worsened by nothing. ROS:   Pertinent positives and negatives are stated within HPI, all other systems reviewed and are negative.  --------------------------------------------- PAST HISTORY ---------------------------------------------  Past Medical History:  has a past medical history of Afib ([facility] [address] Allergic rhinitis, Anticoagulant long-term use, Atrial flutter by electrocardiogram ([facility] [address] CAD (coronary artery disease), CKD (chronic kidney disease), stage III ([address] HTN (hypertension), Hyperlipidemia, Hypothyroidism, MI (myocardial infarction) ([facility] [address] Shingles, and Urinary incontinence. Past Surgical History:  has a past surgical history that includes Cardiac catheterization (06/04/2012); ECHO Compl W Dop Color Flow (6/5/2012); Coronary artery bypass graft (6/6/12); ablation of dysrhythmic focus (6/6/12); Cardioversion (10/21/2014); A-V cardiac pacemaker insertion (11/4/2014); Colonoscopy; eye surgery; Upper gastrointestinal endoscopy; and pacemaker placement. Social History:  reports that she quit smoking about 48 years ago. Her smoking use included cigarettes. She has a 5.00 pack-year smoking history. She has never used smokeless tobacco. She reports that she does not drink alcohol or use drugs. Family History: family history includes Stroke in her mother. The patients home medications have been reviewed. Allergies: Codeine    -------------------------------------------------- RESULTS -------------------------------------------------  All laboratory and radiology results have been personally reviewed by myself   LABS:  No results found for this visit on 06/03/19.     RADIOLOGY:  Interpreted by Radiologist.  XR Acute Abd Series Chest 1 VW   Final Result   1. No acute cardiopulmonary process. 2. No free intraperitoneal air . 3. Pattern of constipation.          ------------------------- NURSING NOTES AND VITALS REVIEWED ---------------------------   The nursing notes within the ED encounter and vital signs as below have been reviewed. /72   Pulse 92   Temp 97.3 °F (36.3 °C) (Oral)   Resp 16   Ht 5' 7\" (1.702 m)   Wt 130 lb (59 kg)   SpO2 98%   BMI 20.36 kg/m²   Oxygen Saturation Interpretation: Normal      ---------------------------------------------------PHYSICAL EXAM--------------------------------------      Constitutional/General: Alert and oriented x3, well appearing, non toxic in NAD  Head: NC/AT  Eyes: PERRL, EOMI  Mouth: Oropharynx clear, handling secretions, no trismus  Neck: Supple, full ROM, no meningeal signs  Pulmonary: Lungs clear to auscultation bilaterally, no wheezes, rales, or rhonchi. Not in respiratory distress  Cardiovascular:  Regular rate and rhythm, no murmurs, gallops, or rubs. 2+ distal pulses  Abdomen: Soft, non tender, mild distention, bs x 4. Extremities: Moves all extremities x 4. Warm and well perfused  Skin: warm and dry without rash  Neurologic: GCS 15,  Psych: Normal Affect      ------------------------------ ED COURSE/MEDICAL DECISION MAKING----------------------  Medications - No data to display      Medical Decision Making:    Patient in no acute distress, no abd tenderness, no fever, no hyoxia, no hypotension, no bowel obstruction. Counseling: The emergency provider has spoken with the patient and discussed todays results, in addition to providing specific details for the plan of care and counseling regarding the diagnosis and prognosis. Questions are answered at this time and they are agreeable with the plan.      --------------------------------- IMPRESSION AND DISPOSITION ---------------------------------    IMPRESSION  1.  Constipation, unspecified constipation type        DISPOSITION  Disposition: Discharge to home  Patient condition is good                Lisa Odor, YAA - MIMI  06/03/19 [id number]

## 2019-06-06 ENCOUNTER — OFFICE VISIT (OUTPATIENT)
Dept: ORTHOPEDIC SURGERY | Age: 84
End: 2019-06-06

## 2019-06-06 DIAGNOSIS — S42.201A CLOSED FRACTURE OF PROXIMAL END OF RIGHT HUMERUS, UNSPECIFIED FRACTURE MORPHOLOGY, INITIAL ENCOUNTER: Primary | ICD-10-CM

## 2019-06-06 PROCEDURE — 99024 POSTOP FOLLOW-UP VISIT: CPT | Performed by: ORTHOPAEDIC SURGERY

## 2019-06-27 ENCOUNTER — OFFICE VISIT (OUTPATIENT)
Dept: ORTHOPEDIC SURGERY | Age: 84
End: 2019-06-27

## 2019-06-27 VITALS
SYSTOLIC BLOOD PRESSURE: 106 MMHG | RESPIRATION RATE: 20 BRPM | DIASTOLIC BLOOD PRESSURE: 66 MMHG | TEMPERATURE: 98.4 F | HEART RATE: 66 BPM

## 2019-06-27 DIAGNOSIS — S42.201A CLOSED FRACTURE OF PROXIMAL END OF RIGHT HUMERUS, UNSPECIFIED FRACTURE MORPHOLOGY, INITIAL ENCOUNTER: Primary | ICD-10-CM

## 2019-06-27 PROCEDURE — 99024 POSTOP FOLLOW-UP VISIT: CPT | Performed by: ORTHOPAEDIC SURGERY

## 2019-06-27 NOTE — PROGRESS NOTES
8 weeks out right proximal humerus fracture. Overall she is doing well. Reports her pain is diminished. Physical exam: Nontender to palpation. Forward elevation abduction limited to about 45 degrees. External rotation to neutral.  Internal rotation to the buttock. She is otherwise neurovascular intact. X-rays in the office today: AP scapular Y views of the right shoulder demonstrate a valgus impacted proximal humerus fracture without significant alignment change when compared to previous imaging from June 6 and May 20, 2019. There is interval callus formation present. Impression office x-rays: Healing proximal humeral fracture    Assessment: 8 weeks post injury right proximal humerus    Plan    Patient is referred to therapy to begin passive range and passive and active range of motion exercise and modalities as needed.   Follow-up 1 month with new x-rays

## 2019-07-03 ENCOUNTER — HOSPITAL ENCOUNTER (OUTPATIENT)
Dept: PHYSICAL THERAPY | Age: 84
Setting detail: THERAPIES SERIES
Discharge: HOME | End: 2019-07-03
Payer: MEDICARE

## 2019-07-03 PROCEDURE — 97161 PT EVAL LOW COMPLEX 20 MIN: CPT | Performed by: PHYSICAL THERAPIST

## 2019-07-03 PROCEDURE — 97110 THERAPEUTIC EXERCISES: CPT | Performed by: PHYSICAL THERAPIST

## 2019-07-03 ASSESSMENT — PAIN DESCRIPTION - PAIN TYPE: TYPE: ACUTE PAIN

## 2019-07-03 ASSESSMENT — PAIN SCALES - GENERAL: PAINLEVEL_OUTOF10: 6

## 2019-07-03 ASSESSMENT — PAIN DESCRIPTION - ORIENTATION: ORIENTATION: RIGHT

## 2019-07-03 ASSESSMENT — PAIN DESCRIPTION - LOCATION: LOCATION: ARM;SHOULDER

## 2019-07-03 ASSESSMENT — PAIN DESCRIPTION - FREQUENCY: FREQUENCY: INTERMITTENT

## 2019-07-03 NOTE — PROGRESS NOTES
[address] [address] [address]      Phone: [phone]  Fax: [phone]    Physical Therapy Daily Treatment Note  Date:  7/3/2019    Patient Name:  Earle Dupont    :  6/15/1928  MRN: [id number]    Restrictions/Precautions:    Diagnosis:  Closed fracture of right proximal humerus   Treatment Diagnosis:  C80.642Q  Insurance/Certification information:  Medicare/[employer]  7/3/19 to 10/2/19  Referring Physician:  Sreedhar Tao MD  Plan of care signed (Y/N):    Visit# / total visits:    Pain level: 6/10   Time In:  1000  Time Out:  1043    Subjective:  See evaluation    Exercises:  Exercise/Equipment Resistance/Repetitions Other comments     pulleys 4 minutes      Table slides: flex, abd, ER 5 sec x 10 reps each      Wall ladder       wand                                                                                                                   Other Therapeutic Activities:  PT evaluation completed    Home Exercise Program:  7/3/19 - table slides    Manual Treatments:  PROM    Modalities:  HP to right shoulder x 10 minutes    Timed Code Treatment Minutes:  20    Total Treatment Minutes:  43    Treatment/Activity Tolerance:  [x] Patient tolerated treatment well [] Patient limited by fatigue  [] Patient limited by pain  [] Patient limited by other medical complications  [] Other:     Prognosis: [x] Good [] Fair  [] Poor    Patient Requires Follow-up: [x] Yes  [] No    Plan:   [] Continue per plan of care [] Alter current plan (see comments)  [x] Plan of care initiated [] Hold pending MD visit [] Discharge  Plan for Next Session:        Electronically signed by:  Leia Antony, PT [id number]

## 2019-07-10 ENCOUNTER — HOSPITAL ENCOUNTER (OUTPATIENT)
Dept: PHYSICAL THERAPY | Age: 84
Setting detail: THERAPIES SERIES
Discharge: HOME | End: 2019-07-10
Payer: MEDICARE

## 2019-07-10 PROCEDURE — 97110 THERAPEUTIC EXERCISES: CPT | Performed by: PHYSICAL THERAPIST

## 2019-07-12 ENCOUNTER — HOSPITAL ENCOUNTER (OUTPATIENT)
Dept: PHYSICAL THERAPY | Age: 84
Setting detail: THERAPIES SERIES
Discharge: HOME | End: 2019-07-12
Payer: MEDICARE

## 2019-07-12 PROCEDURE — 97110 THERAPEUTIC EXERCISES: CPT

## 2019-07-17 ENCOUNTER — HOSPITAL ENCOUNTER (OUTPATIENT)
Dept: PHYSICAL THERAPY | Age: 84
Setting detail: THERAPIES SERIES
Discharge: HOME | End: 2019-07-17
Payer: MEDICARE

## 2019-07-17 PROCEDURE — 97110 THERAPEUTIC EXERCISES: CPT | Performed by: PHYSICAL THERAPIST

## 2019-07-19 ENCOUNTER — HOSPITAL ENCOUNTER (OUTPATIENT)
Dept: PHYSICAL THERAPY | Age: 84
Setting detail: THERAPIES SERIES
Discharge: HOME | End: 2019-07-19
Payer: MEDICARE

## 2019-07-19 PROCEDURE — 97110 THERAPEUTIC EXERCISES: CPT | Performed by: PHYSICAL THERAPIST

## 2019-07-24 ENCOUNTER — HOSPITAL ENCOUNTER (OUTPATIENT)
Dept: PHYSICAL THERAPY | Age: 84
Setting detail: THERAPIES SERIES
Discharge: HOME | End: 2019-07-24
Payer: MEDICARE

## 2019-07-24 PROCEDURE — 97110 THERAPEUTIC EXERCISES: CPT | Performed by: PHYSICAL THERAPIST

## 2019-07-26 ENCOUNTER — HOSPITAL ENCOUNTER (OUTPATIENT)
Dept: PHYSICAL THERAPY | Age: 84
Setting detail: THERAPIES SERIES
Discharge: HOME | End: 2019-07-26
Payer: MEDICARE

## 2019-07-26 PROCEDURE — 97110 THERAPEUTIC EXERCISES: CPT | Performed by: PHYSICAL THERAPIST

## 2019-07-31 ENCOUNTER — HOSPITAL ENCOUNTER (OUTPATIENT)
Dept: PHYSICAL THERAPY | Age: 84
Setting detail: THERAPIES SERIES
Discharge: HOME | End: 2019-07-31
Payer: MEDICARE

## 2019-07-31 PROCEDURE — 97110 THERAPEUTIC EXERCISES: CPT | Performed by: PHYSICAL THERAPIST

## 2019-08-02 ENCOUNTER — HOSPITAL ENCOUNTER (OUTPATIENT)
Dept: PHYSICAL THERAPY | Age: 84
Setting detail: THERAPIES SERIES
Discharge: HOME | End: 2019-08-02
Payer: MEDICARE

## 2019-08-02 NOTE — PROGRESS NOTES
[address]      Phone: [phone]  Fax: [phone]    Physical Therapy  Cancellation/No-show Note  Patient Name:  Sheila Rosen  :  6/15/1928   Date:  2019    For today's appointment patient:  [x]  Cancelled  []  Rescheduled appointment  []  No-show     Reason given by patient:  []  Patient ill  []  Conflicting appointment  []  No transportation    []  Conflict with work  []  No reason given  [x]  Other:     Comments:  Pt having a lot of pain. Pt instructed by doctor to take Tylenol and take the rest of the week off from therapy.     Electronically signed by:  Irma Bobo PT [id number]

## 2019-08-07 ENCOUNTER — TELEPHONE (OUTPATIENT)
Dept: ORTHOPEDIC SURGERY | Age: 84
End: 2019-08-07

## 2019-08-07 ENCOUNTER — HOSPITAL ENCOUNTER (OUTPATIENT)
Dept: PHYSICAL THERAPY | Age: 84
Setting detail: THERAPIES SERIES
Discharge: HOME | End: 2019-08-07
Payer: MEDICARE

## 2019-08-07 DIAGNOSIS — S42.201A CLOSED FRACTURE OF PROXIMAL END OF RIGHT HUMERUS, UNSPECIFIED FRACTURE MORPHOLOGY, INITIAL ENCOUNTER: Primary | ICD-10-CM

## 2019-08-07 PROCEDURE — 97110 THERAPEUTIC EXERCISES: CPT | Performed by: PHYSICAL THERAPIST

## 2019-08-07 NOTE — PROGRESS NOTES
[address]      Phone: [phone]  Fax: [phone]    Physical Therapy Daily Treatment Note  Date:  2019    Patient Name:  Jarek Cote    :  6/15/1928  MRN: [id number]    Restrictions/Precautions:    Diagnosis:  Closed fracture of right proximal humerus   Treatment Diagnosis:  A29.104B  Insurance/Certification information:  Medicare/[employer]  7/3/19 to 10/2/19  Referring Physician:  Benny Caballero MD  Plan of care signed (Y/N):    Visit# / total visits:    Pain level: 0/10 at rest, 7-8/10 with movement   Time In:  1028  Time Out:  1128    Subjective:  No new report.     Exercises:  Exercise/Equipment Resistance/Repetitions Other comments     pulleys 4 minutes      Table slides: flex, abd, ER 5 sec x 10 reps each      Wall ladder x10 reps      wand X 5 reps, pt supine      Scapular retraction x10 reps NT     UBE 10 revolutions fwd/10 revolutions bwd      AROM shoulder flex, pt semi-supine x5-8 reps  NT            Reaching for cones into flexion, and into scaption                                                                               Comments:  Pt tolerating activities much better today, not resisting PROM    Home Exercise Program:  7/3/19 - table slides; 7/10/19 - scapular retraction, emphasized importance of table slides and compliance with HEP    Manual Treatments:  PROM x 10 minutes    Modalities:  HP to right shoulder x 10 minutes    Timed Code Treatment Minutes:  50    Total Treatment Minutes:  60    Treatment/Activity Tolerance:  [] Patient tolerated treatment well [] Patient limited by fatigue  [x] Patient limited by pain  [] Patient limited by other medical complications  [] Other:     Prognosis: [] Good [x] Fair  [] Poor    Patient Requires Follow-up: [x] Yes  [] No    Plan:   [x] Continue per plan of care [] Alter current plan (see comments)  [] Plan of care initiated [] Hold pending MD visit [] Discharge    Plan for Next Session: Electronically signed by:  CHRISTELLE [address]

## 2019-08-08 ENCOUNTER — OFFICE VISIT (OUTPATIENT)
Dept: ORTHOPEDIC SURGERY | Age: 84
End: 2019-08-08

## 2019-08-08 VITALS
TEMPERATURE: 97.5 F | HEIGHT: 67 IN | SYSTOLIC BLOOD PRESSURE: 126 MMHG | DIASTOLIC BLOOD PRESSURE: 82 MMHG | BODY MASS INDEX: 19.62 KG/M2 | RESPIRATION RATE: 22 BRPM | WEIGHT: 125 LBS | HEART RATE: 82 BPM

## 2019-08-08 DIAGNOSIS — S42.201A CLOSED FRACTURE OF PROXIMAL END OF RIGHT HUMERUS, UNSPECIFIED FRACTURE MORPHOLOGY, INITIAL ENCOUNTER: Primary | ICD-10-CM

## 2019-08-08 PROCEDURE — 99024 POSTOP FOLLOW-UP VISIT: CPT | Performed by: ORTHOPAEDIC SURGERY

## 2019-08-08 NOTE — PROGRESS NOTES
10 weeks out right proximal humerus fracture nonoperative management. Overall she is doing well. She has no complaints. She is in therapy. Physical exam: Nontender of the shoulder. Forward elevation abduction 40 degrees. External rotation 20 degrees. Otherwise neurovascular intact. X-rays in the office today: AP scapular Y and axial views of the right shoulder demonstrate a proximal humerus fracture with impaction and healing when compared to June 27 and June 6, 2019 films. No significant displacement from prior exams appreciated. Impression office x-rays: Healing right proximal humerus fracture    Assessment: 10 weeks out right proximal humerus fracture doing well    Plan    She has no restrictions. Continue with therapy. Transition to home exercise program as tolerated.   Follow-up as needed

## 2019-08-09 ENCOUNTER — HOSPITAL ENCOUNTER (OUTPATIENT)
Dept: PHYSICAL THERAPY | Age: 84
Setting detail: THERAPIES SERIES
Discharge: HOME | End: 2019-08-09
Payer: MEDICARE

## 2019-08-09 PROCEDURE — 97110 THERAPEUTIC EXERCISES: CPT | Performed by: PHYSICAL THERAPIST

## 2019-08-16 ENCOUNTER — HOSPITAL ENCOUNTER (OUTPATIENT)
Dept: PHYSICAL THERAPY | Age: 84
Setting detail: THERAPIES SERIES
Discharge: HOME | End: 2019-08-16
Payer: MEDICARE

## 2019-08-16 PROCEDURE — 97530 THERAPEUTIC ACTIVITIES: CPT | Performed by: PHYSICAL THERAPIST

## 2019-08-16 PROCEDURE — 97110 THERAPEUTIC EXERCISES: CPT | Performed by: PHYSICAL THERAPIST

## 2019-08-22 ENCOUNTER — HOSPITAL ENCOUNTER (OUTPATIENT)
Dept: PHYSICAL THERAPY | Age: 84
Setting detail: THERAPIES SERIES
Discharge: HOME | End: 2019-08-22
Payer: MEDICARE

## 2019-08-22 PROCEDURE — 97110 THERAPEUTIC EXERCISES: CPT | Performed by: PHYSICAL THERAPIST

## 2019-08-26 ENCOUNTER — HOSPITAL ENCOUNTER (OUTPATIENT)
Dept: PHYSICAL THERAPY | Age: 84
Setting detail: THERAPIES SERIES
Discharge: HOME | End: 2019-08-26
Payer: MEDICARE

## 2019-08-26 PROCEDURE — 97110 THERAPEUTIC EXERCISES: CPT

## 2019-08-26 NOTE — PROGRESS NOTES
[address]      Phone: [phone]  Fax: [phone]    Physical Therapy Daily Treatment Note  Date:  2019    Patient Name:  Jesica Ghotra    :  6/15/1928  MRN: [id number]    Restrictions/Precautions:    Diagnosis:  Closed fracture of right proximal humerus   Treatment Diagnosis:  D19.851A  Insurance/Certification information:  Medicare/[employer]  7/3/19 to 10/2/19  Referring Physician:  Abbie Syed MD  Plan of care signed (Y/N):    Visit# / total visits:    Pain level: 0/10 at rest, 7/10 with movement   Time In:  1046  Time Out:  1140    Subjective:  No new report.     Exercises:  Exercise/Equipment Resistance/Repetitions Other comments     pulleys 5 minutes      Table slides: flex, abd, ER 5 sec x 10 reps each      Wall ladder x10 reps      AAROM Pt supine, performed intermittently during passive stretch for flex and abd      UBE 2 min fwd/2 min bwd             Reaching for cones into flexion, and into scaption                                                                               Comments:      Home Exercise Program:  7/3/19 - table slides; 7/10/19 - scapular retraction, emphasized importance of table slides and compliance with HEP    Manual Treatments:  PROM x 10 minutes    Modalities:  HP to right shoulder x 10 minutes    Timed Code Treatment Minutes:  44    Total Treatment Minutes:  54    Treatment/Activity Tolerance:  [] Patient tolerated treatment well [] Patient limited by fatigue  [x] Patient limited by pain  [] Patient limited by other medical complications  [] Other:     Prognosis: [] Good [x] Fair  [] Poor    Patient Requires Follow-up: [x] Yes  [] No    Plan:   [x] Continue per plan of care [] Alter current plan (see comments)  [] Plan of care initiated [] Hold pending MD visit [] Discharge    Plan for Next Session:        Electronically signed by:  Adrien Truong, YESSENIA [id number]

## 2019-08-30 ENCOUNTER — HOSPITAL ENCOUNTER (OUTPATIENT)
Dept: PHYSICAL THERAPY | Age: 84
Setting detail: THERAPIES SERIES
Discharge: HOME | End: 2019-08-30
Payer: MEDICARE

## 2019-08-30 PROCEDURE — 97110 THERAPEUTIC EXERCISES: CPT

## 2019-09-03 ENCOUNTER — HOSPITAL ENCOUNTER (OUTPATIENT)
Dept: PHYSICAL THERAPY | Age: 84
Setting detail: THERAPIES SERIES
Discharge: HOME | End: 2019-09-03
Payer: MEDICARE

## 2019-09-03 PROCEDURE — 97110 THERAPEUTIC EXERCISES: CPT | Performed by: PHYSICAL THERAPIST

## 2019-09-06 ENCOUNTER — HOSPITAL ENCOUNTER (OUTPATIENT)
Dept: PHYSICAL THERAPY | Age: 84
Setting detail: THERAPIES SERIES
Discharge: HOME | End: 2019-09-06
Payer: MEDICARE

## 2019-09-06 PROCEDURE — 97110 THERAPEUTIC EXERCISES: CPT | Performed by: PHYSICAL THERAPIST

## 2019-09-06 NOTE — PROGRESS NOTES
[address]      Phone: [phone]  Fax: [phone]    Physical Therapy Daily Treatment Note  Date:  2019    Patient Name:  Jessica Hinojosa    :  6/15/1928  MRN: [id number]    Restrictions/Precautions:    Diagnosis:  Closed fracture of right proximal humerus   Treatment Diagnosis:  S88.236F  Insurance/Certification information:  Medicare/[employer]  7/3/19 to 10/2/19  Referring Physician:  Nate Chaudhry MD  Plan of care signed (Y/N):    Visit# / total visits:    Pain level: 0/10 at rest, 7/10 with movement   Time In:  1020  Time Out:  1115    Subjective:  No new report.     Exercises:  Exercise/Equipment Resistance/Repetitions Other comments     pulleys 5 minutes      Table slides: flex, abd, ER 5 sec x 10 reps each      Wall ladder x10 reps To level 21     AAROM Pt supine, performed intermittently during passive stretch for flex and abd      UBE 2.5 min fwd/2.5 min bwd             Reaching for cones into flexion, and into scaption                                                                               Comments:      Home Exercise Program:  7/3/19 - table slides; 7/10/19 - scapular retraction, emphasized importance of table slides and compliance with HEP    Manual Treatments:  PROM x 5 minutes    Modalities:  HP to right shoulder x 10 minutes    Timed Code Treatment Minutes:  45    Total Treatment Minutes:  55      Treatment/Activity Tolerance:  [] Patient tolerated treatment well [] Patient limited by fatigue  [x] Patient limited by pain  [] Patient limited by other medical complications  [] Other:     Prognosis: [] Good [x] Fair  [] Poor    Patient Requires Follow-up: [] Yes  [x] No    Plan:   [] Continue per plan of care [] Alter current plan (see comments)  [] Plan of care initiated [] Hold pending MD visit [x] Discharge    Plan for Next Session:        Electronically signed by:  Brandie Gaytan, PT [id number]

## 2019-11-11 ENCOUNTER — NURSE ONLY (OUTPATIENT)
Dept: CARDIOLOGY CLINIC | Age: 84
End: 2019-11-11
Payer: MEDICARE

## 2019-11-11 DIAGNOSIS — I49.5 SICK SINUS SYNDROME (HCC): ICD-10-CM

## 2019-11-11 DIAGNOSIS — I48.21 PERMANENT ATRIAL FIBRILLATION (HCC): ICD-10-CM

## 2019-11-11 DIAGNOSIS — Z95.0 STATUS POST PLACEMENT OF CARDIAC PACEMAKER: Primary | ICD-10-CM

## 2019-11-11 PROCEDURE — 93279 PRGRMG DEV EVAL PM/LDLS PM: CPT | Performed by: INTERNAL MEDICINE

## 2019-11-21 ENCOUNTER — OFFICE VISIT (OUTPATIENT)
Dept: CARDIOLOGY CLINIC | Age: 84
End: 2019-11-21
Payer: MEDICARE

## 2019-11-21 VITALS
WEIGHT: 130.2 LBS | SYSTOLIC BLOOD PRESSURE: 108 MMHG | HEART RATE: 84 BPM | HEIGHT: 67 IN | BODY MASS INDEX: 20.44 KG/M2 | DIASTOLIC BLOOD PRESSURE: 60 MMHG | RESPIRATION RATE: 20 BRPM

## 2019-11-21 DIAGNOSIS — I25.5 ISCHEMIC CARDIOMYOPATHY: ICD-10-CM

## 2019-11-21 DIAGNOSIS — I27.20 PULMONARY HTN (HCC): ICD-10-CM

## 2019-11-21 DIAGNOSIS — Z95.0 STATUS POST PLACEMENT OF CARDIAC PACEMAKER: ICD-10-CM

## 2019-11-21 DIAGNOSIS — I48.0 PAF (PAROXYSMAL ATRIAL FIBRILLATION) (HCC): ICD-10-CM

## 2019-11-21 DIAGNOSIS — I34.0 NON-RHEUMATIC MITRAL REGURGITATION: ICD-10-CM

## 2019-11-21 DIAGNOSIS — I38 VHD (VALVULAR HEART DISEASE): ICD-10-CM

## 2019-11-21 DIAGNOSIS — I25.10 CORONARY ARTERY DISEASE INVOLVING NATIVE CORONARY ARTERY OF NATIVE HEART WITHOUT ANGINA PECTORIS: Primary | ICD-10-CM

## 2019-11-21 DIAGNOSIS — Z95.1 STATUS POST CORONARY ARTERY BYPASS GRAFT: ICD-10-CM

## 2019-11-21 PROCEDURE — 4040F PNEUMOC VAC/ADMIN/RCVD: CPT | Performed by: INTERNAL MEDICINE

## 2019-11-21 PROCEDURE — G8420 CALC BMI NORM PARAMETERS: HCPCS | Performed by: INTERNAL MEDICINE

## 2019-11-21 PROCEDURE — 93000 ELECTROCARDIOGRAM COMPLETE: CPT | Performed by: INTERNAL MEDICINE

## 2019-11-21 PROCEDURE — G8427 DOCREV CUR MEDS BY ELIG CLIN: HCPCS | Performed by: INTERNAL MEDICINE

## 2019-11-21 PROCEDURE — G8598 ASA/ANTIPLAT THER USED: HCPCS | Performed by: INTERNAL MEDICINE

## 2019-11-21 PROCEDURE — 1090F PRES/ABSN URINE INCON ASSESS: CPT | Performed by: INTERNAL MEDICINE

## 2019-11-21 PROCEDURE — 1123F ACP DISCUSS/DSCN MKR DOCD: CPT | Performed by: INTERNAL MEDICINE

## 2019-11-21 PROCEDURE — G8484 FLU IMMUNIZE NO ADMIN: HCPCS | Performed by: INTERNAL MEDICINE

## 2019-11-21 PROCEDURE — 1036F TOBACCO NON-USER: CPT | Performed by: INTERNAL MEDICINE

## 2019-11-21 PROCEDURE — 99213 OFFICE O/P EST LOW 20 MIN: CPT | Performed by: INTERNAL MEDICINE

## 2020-03-25 ENCOUNTER — TELEPHONE (OUTPATIENT)
Dept: CARDIOLOGY CLINIC | Age: 85
End: 2020-03-25

## 2020-07-22 ENCOUNTER — NURSE ONLY (OUTPATIENT)
Dept: CARDIOLOGY CLINIC | Age: 85
End: 2020-07-22
Payer: MEDICARE

## 2020-07-22 PROCEDURE — 93279 PRGRMG DEV EVAL PM/LDLS PM: CPT | Performed by: INTERNAL MEDICINE

## 2020-10-09 ENCOUNTER — OFFICE VISIT (OUTPATIENT)
Dept: CARDIOLOGY CLINIC | Age: 85
End: 2020-10-09
Payer: MEDICARE

## 2020-10-09 VITALS
WEIGHT: 125 LBS | RESPIRATION RATE: 14 BRPM | HEIGHT: 67 IN | DIASTOLIC BLOOD PRESSURE: 78 MMHG | BODY MASS INDEX: 19.62 KG/M2 | SYSTOLIC BLOOD PRESSURE: 118 MMHG | HEART RATE: 86 BPM

## 2020-10-09 PROCEDURE — 4040F PNEUMOC VAC/ADMIN/RCVD: CPT | Performed by: INTERNAL MEDICINE

## 2020-10-09 PROCEDURE — G8484 FLU IMMUNIZE NO ADMIN: HCPCS | Performed by: INTERNAL MEDICINE

## 2020-10-09 PROCEDURE — 93000 ELECTROCARDIOGRAM COMPLETE: CPT | Performed by: INTERNAL MEDICINE

## 2020-10-09 PROCEDURE — 1123F ACP DISCUSS/DSCN MKR DOCD: CPT | Performed by: INTERNAL MEDICINE

## 2020-10-09 PROCEDURE — 1090F PRES/ABSN URINE INCON ASSESS: CPT | Performed by: INTERNAL MEDICINE

## 2020-10-09 PROCEDURE — G8420 CALC BMI NORM PARAMETERS: HCPCS | Performed by: INTERNAL MEDICINE

## 2020-10-09 PROCEDURE — G8427 DOCREV CUR MEDS BY ELIG CLIN: HCPCS | Performed by: INTERNAL MEDICINE

## 2020-10-09 PROCEDURE — 1036F TOBACCO NON-USER: CPT | Performed by: INTERNAL MEDICINE

## 2020-10-09 PROCEDURE — 99213 OFFICE O/P EST LOW 20 MIN: CPT | Performed by: INTERNAL MEDICINE

## 2020-10-09 NOTE — PROGRESS NOTES
OFFICE VISIT     PRIMARY CARE PHYSICIAN:      Timi Almeida MD       ALLERGIES / SENSITIVITIES:        Allergies   Allergen Reactions    Codeine           REVIEWED MEDICATIONS:        Current Outpatient Medications:     Magnesium Hydroxide (MILK OF MAGNESIA PO), Take 30 mLs by mouth, Disp: , Rfl:     warfarin (COUMADIN) 4 MG tablet, Take 4 mg by mouth daily, Disp: , Rfl:     furosemide (LASIX) 20 MG tablet, Take 10 mg by mouth daily , Disp: , Rfl: 0    levothyroxine (SYNTHROID) 50 MCG tablet, take 1 tablet by mouth once daily, Disp: 30 tablet, Rfl: 3    metoprolol succinate (TOPROL XL) 50 MG extended release tablet, TAKE 1 TABLET BY MOUTH EVERY EVENING (Patient not taking: Reported on 10/9/2020), Disp: 30 tablet, Rfl: 6    Cholecalciferol (VITAMIN D) 2000 UNITS CAPS capsule, Take 3,000 Units by mouth daily , Disp: , Rfl:       S: REASON FOR VISIT:       Chief Complaint   Patient presents with    Coronary Artery Disease          History of Present Illness:       Office Visit for follow up of CAD, A Fib, VHD   80 yr pt with Hx of CAD, CABG, A Fib D came for f/u visit   No hospitalizations or surgeries since last visit   Not sure what medicines she is taking at home -had \" pill box\"    Angelo Carls any exertional chest pain or short of breath   No palpitations, dizzy or syncope. Fairly active at home   No orthopnea   Try to watch diet          Past Medical History:   Diagnosis Date    Afib ([facility] [address]     Allergic rhinitis     Anticoagulant long-term use     Atrial flutter by electrocardiogram ([facility] [address] 02/10/2014    Admitted [facility] 02/10/14 and cardioverted on 02/13/2014. Resumed on Coumadin.     CAD (coronary artery disease)     CKD (chronic kidney disease), stage III 9/19/2017    HTN (hypertension)     Hyperlipidemia     Hypothyroidism 3/21/2017    MI (myocardial infarction) ([address] 6/12    Shingles     Urinary incontinence             Past Surgical History:   Procedure Laterality Date    A-V CARDIAC PACEMAKER INSERTION  2014    Dr Acosta Huertas DYSRHYTHMIC FOCUS  12    modified maze procedure with LA excision     CARDIAC CATHETERIZATION  2012    Dr. Harvinder Macdonald  10/21/2014    Dr Brooklynn Paez:  200joules = SR    COLONOSCOPY      CORONARY ARTERY BYPASS GRAFT  12    4 vessel / Dr. Hernandez Primer ECHO COMPL [address]  2012         EYE SURGERY      PACEMAKER PLACEMENT      UPPER GASTROINTESTINAL ENDOSCOPY            Family History   Problem Relation Age of Onset    Stroke Mother           Social History     Tobacco Use    Smoking status: Former Smoker     Packs/day: 0.50     Years: 10.00     Pack years: 5.00     Types: Cigarettes     Last attempt to quit: 1971     Years since quittin.3    Smokeless tobacco: Never Used   Substance Use Topics    Alcohol use: No     Comment:           Review of Systems:  Constitutional: negative for fever and chills, or significant weight loss  HEENT: negative for acute visual symptoms or auditory problems, no dysphagia  Respiratory: negative for cough, wheezing, or hemoptysis  Cardiovascular: negative for chest pain, palpitations, and dyspnea  Gastrointestinal: negative for abdominal pain, diarrhea, nausea and vomiting  Endocrine: Negative for polyuria and polydyspsia  Genitourinary:negative for dysuria and hematuria  Derm: negative for rash and skin lesion(s)  Neurological: negative for tingling, numbness, weakness, seizures and tremors  Endocrine: negative for polydipsia and polyuria  Musculoskeletal: negative for pain or tenderness  Psychiatric: negative for anxiety, depression, or suicidal ideations         O:  COMPLETE PHYSICAL EXAM:       /78   Pulse 86   Resp 14   Ht 5' 7\" (1.702 m)   Wt 125 lb (56.7 kg)   BMI 19.58 kg/m²       General:   Patient alert, comfortable, no distress. Appears stated age.    HEENT:    Pupils equal, no icterus, no nasal drainage, tongue moist.   Neck:              No masses, Thyroid not palpable. + elevated JVD, No carotid bruit. Chest:   Normal configuration, non tender. Pacer site Ok   Lungs:   Clear to auscultation bilaterally, few scattered rhonchi. Cardiovascular:  Irregular rhythm, 2/6 systolic murmur, No S3, no palpable thrills,    Abdomen:  Soft, Bowel sounds normal   Extremities:  Trace edema. Distal pulses palpable. No cyanosis, no clubbing. Skin:   Good turgor, warm and dry, no cyanosis. Musculoskeletal: No joint swelling or deformity. Neuro:   Cranial nerves grossly intact; No focal neurologic deficit. Psych:   Alert, good mood and effect. REVIEW OF DIAGNOSTIC TESTS:        Electrocardiogram: A Fib    Pacer interrogated 7/2020       A/P:   ASSESSMENT / PLAN:    Caitlin Reyes was seen today for coronary artery disease. Diagnoses and all orders for this visit:      Coronary artery disease involving native coronary artery of native heart without angina pectoris; ;  No Aspirin due to Coumadin, Off Statin per her request, On beta blockers  -     EKG 12 Lead     Hx of CABG x4 2012-Dr Lloyd: Stable     Permanent Atrial fibrillation: (HCC)l s/p Maze 2012; s/p DCCV 2014 - On Coumadin; Dr Terrell follows INRs  -     EKG 12 Lead     Ischemic cardiomyopathy: EF 25-30% by Echo 6/11/18-She declined Life Vest/ICD upgrade; Continue BB, Diuretics; No ACE/ARB/Entrsto due to low BP and CKD     Moderate to severe mitral regurgitation; Declined valve clinic referral for valve surgery - Prefer Medical Rx only     Status post placement of cardiac pacemaker-11/2014-Normal Fn     Pulmonary hypertension, moderate to severe     Severe tricuspid valve regurgitation      Essential hypertension: Stable   .     Monitor BP and heart rates. Above recommendations discussed with her. All questions answered about cardiac diagnoses and cardiac medications. Continue current medications. Compliance with medications and f/u with all physicians discussed.    Risk factor modification based on risk profile discussed. Call if any exertional chest pain, short of breath, dizzy or palpitations   Follow up in 9 months or earlier if needed.    She will call with name/dose of all medications      [facility]  [address], [place], [address] [facility]  [phone]

## 2020-10-09 NOTE — PATIENT INSTRUCTIONS
Call with name and dose of each medication you are taking at home  Call for chest pain or heart racing

## 2020-11-05 ENCOUNTER — APPOINTMENT (OUTPATIENT)
Dept: CT IMAGING | Age: 85
End: 2020-11-05
Payer: MEDICARE

## 2020-11-05 ENCOUNTER — HOSPITAL ENCOUNTER (EMERGENCY)
Age: 85
Discharge: HOME | End: 2020-11-05
Attending: FAMILY MEDICINE
Payer: MEDICARE

## 2020-11-05 VITALS
HEART RATE: 76 BPM | OXYGEN SATURATION: 98 % | BODY MASS INDEX: 20.05 KG/M2 | RESPIRATION RATE: 16 BRPM | WEIGHT: 128 LBS | TEMPERATURE: 97.6 F | SYSTOLIC BLOOD PRESSURE: 125 MMHG | DIASTOLIC BLOOD PRESSURE: 78 MMHG

## 2020-11-05 LAB
ALBUMIN SERPL-MCNC: 4.2 G/DL (ref 3.5–5.2)
ALP BLD-CCNC: 82 U/L (ref 35–104)
ALT SERPL-CCNC: 7 U/L (ref 0–32)
ANION GAP SERPL CALCULATED.3IONS-SCNC: 8 MMOL/L (ref 7–16)
AST SERPL-CCNC: 20 U/L (ref 0–31)
BASOPHILS ABSOLUTE: 0.02 E9/L (ref 0–0.2)
BASOPHILS RELATIVE PERCENT: 0.4 % (ref 0–2)
BILIRUB SERPL-MCNC: 1.1 MG/DL (ref 0–1.2)
BUN BLDV-MCNC: 19 MG/DL (ref 8–23)
CALCIUM SERPL-MCNC: 10.8 MG/DL (ref 8.6–10.2)
CHLORIDE BLD-SCNC: 103 MMOL/L (ref 98–107)
CO2: 27 MMOL/L (ref 22–29)
CREAT SERPL-MCNC: 1 MG/DL (ref 0.5–1)
EKG ATRIAL RATE: 153 BPM
EKG Q-T INTERVAL: 398 MS
EKG QRS DURATION: 94 MS
EKG QTC CALCULATION (BAZETT): 441 MS
EKG R AXIS: 0 DEGREES
EKG T AXIS: -90 DEGREES
EKG VENTRICULAR RATE: 74 BPM
EOSINOPHILS ABSOLUTE: 0.03 E9/L (ref 0.05–0.5)
EOSINOPHILS RELATIVE PERCENT: 0.6 % (ref 0–6)
GFR AFRICAN AMERICAN: >60
GFR NON-AFRICAN AMERICAN: 52 ML/MIN/1.73
GLUCOSE BLD-MCNC: 102 MG/DL (ref 74–99)
HCT VFR BLD CALC: 45.6 % (ref 34–48)
HEMOGLOBIN: 15 G/DL (ref 11.5–15.5)
IMMATURE GRANULOCYTES #: 0.02 E9/L
IMMATURE GRANULOCYTES %: 0.4 % (ref 0–5)
INR BLD: 2.7
LYMPHOCYTES ABSOLUTE: 0.86 E9/L (ref 1.5–4)
LYMPHOCYTES RELATIVE PERCENT: 17.2 % (ref 20–42)
MCH RBC QN AUTO: 32.2 PG (ref 26–35)
MCHC RBC AUTO-ENTMCNC: 32.9 % (ref 32–34.5)
MCV RBC AUTO: 97.9 FL (ref 80–99.9)
MONOCYTES ABSOLUTE: 0.44 E9/L (ref 0.1–0.95)
MONOCYTES RELATIVE PERCENT: 8.8 % (ref 2–12)
NEUTROPHILS ABSOLUTE: 3.64 E9/L (ref 1.8–7.3)
NEUTROPHILS RELATIVE PERCENT: 72.6 % (ref 43–80)
PDW BLD-RTO: 13.9 FL (ref 11.5–15)
PLATELET # BLD: 183 E9/L (ref 130–450)
PMV BLD AUTO: 9.5 FL (ref 7–12)
POTASSIUM SERPL-SCNC: 3.9 MMOL/L (ref 3.5–5)
PROTHROMBIN TIME: 30.9 SEC (ref 9.3–12.4)
RBC # BLD: 4.66 E12/L (ref 3.5–5.5)
SODIUM BLD-SCNC: 138 MMOL/L (ref 132–146)
TOTAL PROTEIN: 7.3 G/DL (ref 6.4–8.3)
TROPONIN: <0.01 NG/ML (ref 0–0.03)
WBC # BLD: 5 E9/L (ref 4.5–11.5)

## 2020-11-05 PROCEDURE — 93005 ELECTROCARDIOGRAM TRACING: CPT | Performed by: FAMILY MEDICINE

## 2020-11-05 PROCEDURE — 85610 PROTHROMBIN TIME: CPT

## 2020-11-05 PROCEDURE — 99284 EMERGENCY DEPT VISIT MOD MDM: CPT

## 2020-11-05 PROCEDURE — 93010 ELECTROCARDIOGRAM REPORT: CPT | Performed by: INTERNAL MEDICINE

## 2020-11-05 PROCEDURE — 80053 COMPREHEN METABOLIC PANEL: CPT

## 2020-11-05 PROCEDURE — 70450 CT HEAD/BRAIN W/O DYE: CPT

## 2020-11-05 PROCEDURE — 85025 COMPLETE CBC W/AUTO DIFF WBC: CPT

## 2020-11-05 PROCEDURE — 2580000003 HC RX 258: Performed by: FAMILY MEDICINE

## 2020-11-05 PROCEDURE — 36415 COLL VENOUS BLD VENIPUNCTURE: CPT

## 2020-11-05 PROCEDURE — 71045 X-RAY EXAM CHEST 1 VIEW: CPT

## 2020-11-05 PROCEDURE — 84484 ASSAY OF TROPONIN QUANT: CPT

## 2020-11-05 RX ORDER — 0.9 % SODIUM CHLORIDE 0.9 %
1000 INTRAVENOUS SOLUTION INTRAVENOUS ONCE
Status: COMPLETED | OUTPATIENT
Start: 2020-11-05 | End: 2020-11-05

## 2020-11-05 RX ADMIN — SODIUM CHLORIDE 1000 ML: 9 INJECTION, SOLUTION INTRAVENOUS at 10:49

## 2020-11-05 NOTE — ED PROVIDER NOTES
HPI:  11/5/20, Time: 10:38 AM PEREZ Shepherd is a 80 y.o. female presenting to the ED for episode of confusion not feeling well lasting 15 min one day ago with recent lack of energy, beginning several weeks  ago. The complaint has been persistent, moderate in severity, and worsened by nothing. States she is feels fine now except for her lack of energy she was concerned about a stroke. This been no cough fever chills nausea vomiting no chest pain no shortness of breath no abdominal pain no numbness tingling or weakness no difficulty with speech no difficulty swallowing. She is having a conflict with her daughters who are out of state in different places the each once to have her mother live with them patient does not want to live with either of them since she cherishes her independence and feels he can take care of herself. ROS:   Pertinent positives and negatives are stated within HPI, all other systems reviewed and are negative.  --------------------------------------------- PAST HISTORY ---------------------------------------------  Past Medical History:  has a past medical history of Afib ([facility] [address] Allergic rhinitis, Anticoagulant long-term use, [facility] fl[facility] CAD (coronary artery disease), CKD (chronic kidney disease), stage III, HTN (hypertension), Hyperlipidemia, Hypothyroidism, MI (myocardial infarction) ([facility] [address] Shingles, and Urinary incontinence. Past Surgical History:  has a past surgical history that includes Cardiac catheterization (06/04/2012); ECHO Compl W Dop Color Flow (6/5/2012); Coronary artery bypass graft (6/6/12); ablation of dysrhythmic focus (6/6/12); Cardioversion (10/21/2014); A-V cardiac pacemaker insertion (11/4/2014); Colonoscopy; eye surgery; Upper gastrointestinal endoscopy; and pacemaker placement. Social History:  reports that she quit smoking about 49 years ago. Her smoking use included cigarettes. She has a 5.00 pack-year smoking history. She has never used smokeless tobacco. She reports that she does not drink alcohol or use drugs. Family History: family history includes Stroke in her mother. The patients home medications have been reviewed. Allergies: Codeine    ---------------------------------------------------PHYSICAL EXAM--------------------------------------      Constitutional/General: Alert and oriented x3, well appearing, non toxic in NAD  Head: Normocephalic and atraumatic  Eyes: PERRL, EOMI  Mouth: Oropharynx clear, handling secretions, no trismus  Neck: Supple, full ROM, non tender to palpation in the midline, no stridor, no crepitus, no meningeal signs  Pulmonary: Lungs clear to auscultation bilaterally, no wheezes, rales, or rhonchi. Not in respiratory distress  Cardiovascular:  Regular rate. Regular rhythm. No murmurs, gallops, or rubs. 2+ distal pulses  Chest: no chest wall tenderness  Abdomen: Soft. Non tender. Non distended. +BS. No rebound, guarding, or rigidity. No pulsatile masses appreciated. Musculoskeletal: Moves all extremities x 4. Warm and well perfused, no clubbing, cyanosis, or edema. Capillary refill <3 seconds  Skin: warm and dry. No rashes. Neurologic: GCS 15, CN 2-12 grossly intact, no focal deficits, symmetric strength 5/5 in the upper and lower extremities bilaterally  Psych: Normal Affect    -------------------------------------------------- RESULTS -------------------------------------------------  I have personally reviewed all laboratory and imaging results for this patient. Results are listed below.      LABS:  Results for orders placed or performed during the hospital encounter of 11/05/20   CBC Auto Differential   Result Value Ref Range    WBC 5.0 4.5 - 11.5 E9/L    RBC 4.66 3.50 - 5.50 E12/L    Hemoglobin 15.0 11.5 - 15.5 g/dL    Hematocrit 45.6 34.0 - 48.0 %    MCV 97.9 80.0 - 99.9 fL    MCH 32.2 26.0 - 35.0 pg    MCHC 32.9 32.0 - 34.5 %    RDW 13.9 11.5 - 15.0 fL    Platelets [phone]

## 2020-11-05 NOTE — ED NOTES
All blood and urine collected and sent to lab. Patient currently has mask on. Nurse currently has mask and gloves on while providing patient care.      Tanya Erwin RN  11/05/20 [address], RN  11/05/20 [id number]
Up to restroom with steady gait.       Harrison Lazcano RN  11/05/20 [id number]
No

## 2020-11-06 ENCOUNTER — TELEPHONE (OUTPATIENT)
Dept: CARDIOLOGY CLINIC | Age: 85
End: 2020-11-06

## 2020-11-06 NOTE — TELEPHONE ENCOUNTER
DR Peña Flies  CAN SHE USE [facility] PRESERVISION FOR MACULAR DEGENERATION? SHE WANTED TO CHECK WITH YOU FIRST SINCE SHE IS ON COUMADIN. IT CONTAINS VITAMIN E.   IS THERE A LIMIT ON THE CHOCOLATE SHE CAN HAVE?     [address] [phone]

## 2020-12-16 ENCOUNTER — NURSE ONLY (OUTPATIENT)
Dept: CARDIOLOGY CLINIC | Age: 85
End: 2020-12-16
Payer: MEDICARE

## 2020-12-16 PROCEDURE — 93279 PRGRMG DEV EVAL PM/LDLS PM: CPT | Performed by: INTERNAL MEDICINE

## 2021-04-15 ENCOUNTER — TELEPHONE (OUTPATIENT)
Dept: CARDIOLOGY CLINIC | Age: 86
End: 2021-04-15

## 2021-04-15 NOTE — TELEPHONE ENCOUNTER
Patient's daughter called. They have moved [facility] down to live with them in [place]. New address is [address], [place], [place], [address]. She stated they will be requesting records.

## 2024-06-20 NOTE — PROGRESS NOTES
Airway    Performed by: Justin Luu  Authorized by: Brenda Clayton MD    Final Airway Type:  Supraglottic airway  Final Supraglottic Airway:  Unique  SGA Size*:  5  Attempts*:  1  Number of Other Approaches Attempted:  0   Patient Identified, Procedure confirmed, Emergency equipment available and Safety protocols followed  Location:  OR  Urgency:  Elective  Difficult Airway: No    Indications for Airway Management:  Anesthesia  Sedation Level:  Deep  Mask Difficulty Assessment:  0 - not attempted  Start Time: 6/20/2024 2:26 PM       [address]      Phone: [phone]  Fax: [phone]    Physical Therapy Daily Treatment Note  Date:  2019    Patient Name:  Merlin Robles    :  6/15/1928  MRN: [id number]    Restrictions/Precautions:    Diagnosis:  Closed fracture of right proximal humerus   Treatment Diagnosis:  L44.812J  Insurance/Certification information:  Medicare/[employer]  7/3/19 to 10/2/19  Referring Physician:  Suresh Gabriel MD  Plan of care signed (Y/N):    Visit# / total visits:    Pain level: 0/10 at rest, 7-8/10 with movement   Time In:  1020  Time Out:  1115    Subjective:  No new report.     Exercises:  Exercise/Equipment Resistance/Repetitions Other comments     pulleys 5 minutes      Table slides: flex, abd, ER 5 sec x 10 reps each      Wall ladder x10 reps      wand X 5 reps, pt supine      Scapular retraction x10 reps      UBE 20 revolutions fwd/20 revolutions bwd      AROM shoulder flex, pt semi-supine x5-8 reps  Constant cuing and encouragement to complete exercise                                                                                            Other Therapeutic Activities:      Home Exercise Program:  7/3/19 - table slides; 7/10/19 - scapular retraction, emphasized importance of table slides and compliance with HEP    Manual Treatments:  PROM x 10 minutes    Modalities:  HP to right shoulder x 10 minutes    Timed Code Treatment Minutes:  45    Total Treatment Minutes:  55    Treatment/Activity Tolerance:  [] Patient tolerated treatment well [] Patient limited by fatigue  [x] Patient limited by pain  [] Patient limited by other medical complications  [] Other:     Prognosis: [x] Good [] Fair  [] Poor    Patient Requires Follow-up: [x] Yes  [] No    Plan:   [x] Continue per plan of care [] Alter current plan (see comments)  [] Plan of care initiated [] Hold pending MD visit [] Discharge    Plan for Next Session:        Electronically signed by:  Stanley Ortiz